# Patient Record
Sex: MALE | Race: WHITE | NOT HISPANIC OR LATINO
[De-identification: names, ages, dates, MRNs, and addresses within clinical notes are randomized per-mention and may not be internally consistent; named-entity substitution may affect disease eponyms.]

---

## 2020-12-31 ENCOUNTER — TRANSCRIPTION ENCOUNTER (OUTPATIENT)
Age: 65
End: 2020-12-31

## 2021-07-27 ENCOUNTER — APPOINTMENT (OUTPATIENT)
Dept: CARDIOLOGY | Facility: CLINIC | Age: 66
End: 2021-07-27
Payer: MEDICARE

## 2021-07-27 ENCOUNTER — NON-APPOINTMENT (OUTPATIENT)
Age: 66
End: 2021-07-27

## 2021-07-27 VITALS
DIASTOLIC BLOOD PRESSURE: 93 MMHG | HEART RATE: 84 BPM | BODY MASS INDEX: 31.08 KG/M2 | WEIGHT: 222 LBS | SYSTOLIC BLOOD PRESSURE: 157 MMHG | HEIGHT: 71 IN | OXYGEN SATURATION: 95 %

## 2021-07-27 DIAGNOSIS — R06.00 DYSPNEA, UNSPECIFIED: ICD-10-CM

## 2021-07-27 DIAGNOSIS — Z86.39 PERSONAL HISTORY OF OTHER ENDOCRINE, NUTRITIONAL AND METABOLIC DISEASE: ICD-10-CM

## 2021-07-27 DIAGNOSIS — R94.31 ABNORMAL ELECTROCARDIOGRAM [ECG] [EKG]: ICD-10-CM

## 2021-07-27 DIAGNOSIS — I10 ESSENTIAL (PRIMARY) HYPERTENSION: ICD-10-CM

## 2021-07-27 DIAGNOSIS — F17.200 NICOTINE DEPENDENCE, UNSPECIFIED, UNCOMPLICATED: ICD-10-CM

## 2021-07-27 DIAGNOSIS — Z86.79 PERSONAL HISTORY OF OTHER DISEASES OF THE CIRCULATORY SYSTEM: ICD-10-CM

## 2021-07-27 DIAGNOSIS — Z87.09 PERSONAL HISTORY OF OTHER DISEASES OF THE RESPIRATORY SYSTEM: ICD-10-CM

## 2021-07-27 PROCEDURE — 93000 ELECTROCARDIOGRAM COMPLETE: CPT

## 2021-07-27 PROCEDURE — 99204 OFFICE O/P NEW MOD 45 MIN: CPT

## 2021-07-27 NOTE — DISCUSSION/SUMMARY
[FreeTextEntry1] : Adryan is a 66-year-old male with hypertension, hyperlipidemia, and asbestos exposure.  He reports mild dyspnea, though his exercise tolerance has been limited by orthopedic issues.  His blood pressure is elevated, though improved on repeat evaluation.  His EKG demonstrates a sinus rhythm, with a nonspecific ST abnormality.  His physical exam is unremarkable.\par \par He will have a 2D echocardiogram to evaluate for structural heart disease, and a pharmacologic nuclear stress test to evaluate for ischemia.  He will not be able to exercise on a treadmill because of knee issues.  I have requested a copy of his most recent blood work and lung CT, along with updated medication list.  I have also stressed the importance of smoking cessation, though he is not ready to quit.  We will speak after the above testing, and arrange follow-up.

## 2021-07-27 NOTE — HISTORY OF PRESENT ILLNESS
[FreeTextEntry1] : Adryan is a 66-year-old male here to establish care.  He reports a history of hypertension and hyperlipidemia.\par \par He last saw a cardiologist about 8 years ago, and reports eventually being sent to [place] for cardiac catheterization which was unremarkable.\par He also reports a past medical history of asbestos exposure.  He has mild dyspnea, which he attributes this.  He has no chest pain or palpitations.  He also denies lower extremity swelling, orthopnea, PND, dizziness, lightheadedness, and near syncope.  His exercise tolerance has been limited by orthopedic issues.\par \par \par He currently smokes about 1/2 pack/day.  He denies a family history of premature CAD.\par He does take some medications, though the list is unavailable.

## 2021-08-02 ENCOUNTER — MED ADMIN CHARGE (OUTPATIENT)
Age: 66
End: 2021-08-02

## 2021-08-02 ENCOUNTER — APPOINTMENT (OUTPATIENT)
Dept: CARDIOLOGY | Facility: CLINIC | Age: 66
End: 2021-08-02
Payer: MEDICARE

## 2021-08-02 PROCEDURE — 93306 TTE W/DOPPLER COMPLETE: CPT

## 2021-08-02 RX ORDER — PERFLUTREN 6.52 MG/ML
6.52 INJECTION, SUSPENSION INTRAVENOUS
Qty: 1 | Refills: 0 | Status: COMPLETED | OUTPATIENT
Start: 2021-08-02

## 2021-08-02 RX ADMIN — PERFLUTREN MG/ML: 6.52 INJECTION, SUSPENSION INTRAVENOUS at 00:00

## 2021-08-23 ENCOUNTER — APPOINTMENT (OUTPATIENT)
Dept: CARDIOLOGY | Facility: CLINIC | Age: 66
End: 2021-08-23
Payer: MEDICARE

## 2021-08-23 PROCEDURE — A9500: CPT

## 2021-08-23 PROCEDURE — 93015 CV STRESS TEST SUPVJ I&R: CPT

## 2021-08-23 PROCEDURE — 78452 HT MUSCLE IMAGE SPECT MULT: CPT

## 2021-09-17 ENCOUNTER — TRANSCRIPTION ENCOUNTER (OUTPATIENT)
Age: 66
End: 2021-09-17

## 2023-08-20 ENCOUNTER — INPATIENT (INPATIENT)
Facility: HOSPITAL | Age: 68
LOS: 2 days | Discharge: HOME | DRG: 347 | End: 2023-08-23
Attending: INTERNAL MEDICINE | Admitting: INTERNAL MEDICINE
Payer: MEDICARE

## 2023-08-20 VITALS
TEMPERATURE: 99 F | DIASTOLIC BLOOD PRESSURE: 51 MMHG | RESPIRATION RATE: 18 BRPM | SYSTOLIC BLOOD PRESSURE: 88 MMHG | WEIGHT: 235.01 LBS | OXYGEN SATURATION: 97 % | HEIGHT: 71 IN | HEART RATE: 76 BPM

## 2023-08-20 DIAGNOSIS — K61.0 ANAL ABSCESS: ICD-10-CM

## 2023-08-20 DIAGNOSIS — R79.89 OTHER SPECIFIED ABNORMAL FINDINGS OF BLOOD CHEMISTRY: ICD-10-CM

## 2023-08-20 DIAGNOSIS — D64.9 ANEMIA, UNSPECIFIED: ICD-10-CM

## 2023-08-20 DIAGNOSIS — R93.89 ABNORMAL FINDINGS ON DIAGNOSTIC IMAGING OF OTHER SPECIFIED BODY STRUCTURES: ICD-10-CM

## 2023-08-20 DIAGNOSIS — E78.5 HYPERLIPIDEMIA, UNSPECIFIED: ICD-10-CM

## 2023-08-20 DIAGNOSIS — I10 ESSENTIAL (PRIMARY) HYPERTENSION: ICD-10-CM
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
## 2023-08-20 DIAGNOSIS — Z29.9 ENCOUNTER FOR PROPHYLACTIC MEASURES, UNSPECIFIED: ICD-10-CM

## 2023-08-20 LAB
ALBUMIN SERPL ELPH-MCNC: 3 G/DL — LOW (ref 3.3–5)
ALP SERPL-CCNC: 55 U/L — SIGNIFICANT CHANGE UP (ref 40–120)
ALT FLD-CCNC: 69 U/L — SIGNIFICANT CHANGE UP (ref 12–78)
ANION GAP SERPL CALC-SCNC: 8 MMOL/L — SIGNIFICANT CHANGE UP (ref 5–17)
APPEARANCE UR: ABNORMAL
APTT BLD: 32.3 SEC — SIGNIFICANT CHANGE UP (ref 24.5–35.6)
AST SERPL-CCNC: 227 U/L — HIGH (ref 15–37)
BASOPHILS # BLD AUTO: 0.02 K/UL — SIGNIFICANT CHANGE UP (ref 0–0.2)
BASOPHILS NFR BLD AUTO: 0.2 % — SIGNIFICANT CHANGE UP (ref 0–2)
BILIRUB SERPL-MCNC: 0.9 MG/DL — SIGNIFICANT CHANGE UP (ref 0.2–1.2)
BILIRUB UR-MCNC: NEGATIVE — SIGNIFICANT CHANGE UP
BUN SERPL-MCNC: 25 MG/DL — HIGH (ref 7–23)
CALCIUM SERPL-MCNC: 8.7 MG/DL — SIGNIFICANT CHANGE UP (ref 8.5–10.1)
CHLORIDE SERPL-SCNC: 106 MMOL/L — SIGNIFICANT CHANGE UP (ref 96–108)
CO2 SERPL-SCNC: 25 MMOL/L — SIGNIFICANT CHANGE UP (ref 22–31)
COLOR SPEC: SIGNIFICANT CHANGE UP
CREAT SERPL-MCNC: 1.6 MG/DL — HIGH (ref 0.5–1.3)
DIFF PNL FLD: ABNORMAL
EGFR: 46 ML/MIN/1.73M2 — LOW
EOSINOPHIL # BLD AUTO: 0 K/UL — SIGNIFICANT CHANGE UP (ref 0–0.5)
EOSINOPHIL NFR BLD AUTO: 0 % — SIGNIFICANT CHANGE UP (ref 0–6)
GLUCOSE SERPL-MCNC: 123 MG/DL — HIGH (ref 70–99)
GLUCOSE UR QL: NEGATIVE MG/DL — SIGNIFICANT CHANGE UP
HCT VFR BLD CALC: 37.8 % — LOW (ref 39–50)
HGB BLD-MCNC: 12.8 G/DL — LOW (ref 13–17)
IMM GRANULOCYTES NFR BLD AUTO: 0.5 % — SIGNIFICANT CHANGE UP (ref 0–0.9)
INR BLD: 1.3 RATIO — HIGH (ref 0.85–1.18)
KETONES UR-MCNC: 15 MG/DL
LACTATE SERPL-SCNC: 1.4 MMOL/L — SIGNIFICANT CHANGE UP (ref 0.7–2)
LEUKOCYTE ESTERASE UR-ACNC: ABNORMAL
LYMPHOCYTES # BLD AUTO: 0.83 K/UL — LOW (ref 1–3.3)
LYMPHOCYTES # BLD AUTO: 9.1 % — LOW (ref 13–44)
MCHC RBC-ENTMCNC: 33.6 PG — SIGNIFICANT CHANGE UP (ref 27–34)
MCHC RBC-ENTMCNC: 33.9 GM/DL — SIGNIFICANT CHANGE UP (ref 32–36)
MCV RBC AUTO: 99.2 FL — SIGNIFICANT CHANGE UP (ref 80–100)
MONOCYTES # BLD AUTO: 0.53 K/UL — SIGNIFICANT CHANGE UP (ref 0–0.9)
MONOCYTES NFR BLD AUTO: 5.8 % — SIGNIFICANT CHANGE UP (ref 2–14)
NEUTROPHILS # BLD AUTO: 7.67 K/UL — HIGH (ref 1.8–7.4)
NEUTROPHILS NFR BLD AUTO: 84.4 % — HIGH (ref 43–77)
NITRITE UR-MCNC: NEGATIVE — SIGNIFICANT CHANGE UP
NRBC # BLD: 0 /100 WBCS — SIGNIFICANT CHANGE UP (ref 0–0)
PH UR: 5.5 — SIGNIFICANT CHANGE UP (ref 5–8)
PLATELET # BLD AUTO: 142 K/UL — LOW (ref 150–400)
POTASSIUM SERPL-MCNC: 3.5 MMOL/L — SIGNIFICANT CHANGE UP (ref 3.5–5.3)
POTASSIUM SERPL-SCNC: 3.5 MMOL/L — SIGNIFICANT CHANGE UP (ref 3.5–5.3)
PROT SERPL-MCNC: 7.1 G/DL — SIGNIFICANT CHANGE UP (ref 6–8.3)
PROT UR-MCNC: 100 MG/DL
PROTHROM AB SERPL-ACNC: 15.1 SEC — HIGH (ref 9.5–13)
RBC # BLD: 3.81 M/UL — LOW (ref 4.2–5.8)
RBC # FLD: 12.8 % — SIGNIFICANT CHANGE UP (ref 10.3–14.5)
SODIUM SERPL-SCNC: 139 MMOL/L — SIGNIFICANT CHANGE UP (ref 135–145)
SP GR SPEC: 1.04 — HIGH (ref 1–1.03)
TRANSFERRIN SERPL-MCNC: 179 MG/DL — LOW (ref 200–360)
UROBILINOGEN FLD QL: 1 MG/DL — SIGNIFICANT CHANGE UP (ref 0.2–1)
WBC # BLD: 9.1 K/UL — SIGNIFICANT CHANGE UP (ref 3.8–10.5)
WBC # FLD AUTO: 9.1 K/UL — SIGNIFICANT CHANGE UP (ref 3.8–10.5)

## 2023-08-20 PROCEDURE — 99285 EMERGENCY DEPT VISIT HI MDM: CPT

## 2023-08-20 PROCEDURE — 10060 I&D ABSCESS SIMPLE/SINGLE: CPT

## 2023-08-20 PROCEDURE — 93010 ELECTROCARDIOGRAM REPORT: CPT

## 2023-08-20 PROCEDURE — 74177 CT ABD & PELVIS W/CONTRAST: CPT | Mod: 26,MA

## 2023-08-20 PROCEDURE — 99223 1ST HOSP IP/OBS HIGH 75: CPT | Mod: 25

## 2023-08-20 PROCEDURE — 99223 1ST HOSP IP/OBS HIGH 75: CPT | Mod: GC

## 2023-08-20 RX ORDER — SODIUM CHLORIDE 9 MG/ML
1000 INJECTION INTRAMUSCULAR; INTRAVENOUS; SUBCUTANEOUS
Refills: 0 | Status: DISCONTINUED | OUTPATIENT
Start: 2023-08-20 | End: 2023-08-21

## 2023-08-20 RX ORDER — PIPERACILLIN AND TAZOBACTAM 4; .5 G/20ML; G/20ML
3.38 INJECTION, POWDER, LYOPHILIZED, FOR SOLUTION INTRAVENOUS ONCE
Refills: 0 | Status: COMPLETED | OUTPATIENT
Start: 2023-08-20 | End: 2023-08-20

## 2023-08-20 RX ORDER — PIPERACILLIN AND TAZOBACTAM 4; .5 G/20ML; G/20ML
3.38 INJECTION, POWDER, LYOPHILIZED, FOR SOLUTION INTRAVENOUS ONCE
Refills: 0 | Status: COMPLETED | OUTPATIENT
Start: 2023-08-21 | End: 2023-08-21

## 2023-08-20 RX ORDER — PIPERACILLIN AND TAZOBACTAM 4; .5 G/20ML; G/20ML
3.38 INJECTION, POWDER, LYOPHILIZED, FOR SOLUTION INTRAVENOUS ONCE
Refills: 0 | Status: DISCONTINUED | OUTPATIENT
Start: 2023-08-21 | End: 2023-08-21

## 2023-08-20 RX ORDER — SODIUM CHLORIDE 9 MG/ML
1000 INJECTION INTRAMUSCULAR; INTRAVENOUS; SUBCUTANEOUS ONCE
Refills: 0 | Status: COMPLETED | OUTPATIENT
Start: 2023-08-20 | End: 2023-08-20

## 2023-08-20 RX ORDER — SIMVASTATIN 20 MG/1
1 TABLET, FILM COATED ORAL
Refills: 0 | DISCHARGE

## 2023-08-20 RX ORDER — THIAMINE MONONITRATE (VIT B1) 100 MG
100 TABLET ORAL DAILY
Refills: 0 | Status: DISCONTINUED | OUTPATIENT
Start: 2023-08-20 | End: 2023-08-23

## 2023-08-20 RX ORDER — NICOTINE POLACRILEX 2 MG
1 GUM BUCCAL DAILY
Refills: 0 | Status: DISCONTINUED | OUTPATIENT
Start: 2023-08-20 | End: 2023-08-23

## 2023-08-20 RX ORDER — PIPERACILLIN AND TAZOBACTAM 4; .5 G/20ML; G/20ML
3.38 INJECTION, POWDER, LYOPHILIZED, FOR SOLUTION INTRAVENOUS EVERY 8 HOURS
Refills: 0 | Status: DISCONTINUED | OUTPATIENT
Start: 2023-08-21 | End: 2023-08-23

## 2023-08-20 RX ORDER — ATORVASTATIN CALCIUM 80 MG/1
40 TABLET, FILM COATED ORAL AT BEDTIME
Refills: 0 | Status: DISCONTINUED | OUTPATIENT
Start: 2023-08-20 | End: 2023-08-23

## 2023-08-20 RX ORDER — FOLIC ACID 0.8 MG
1 TABLET ORAL DAILY
Refills: 0 | Status: DISCONTINUED | OUTPATIENT
Start: 2023-08-20 | End: 2023-08-23

## 2023-08-20 RX ORDER — ACETAMINOPHEN 500 MG
650 TABLET ORAL EVERY 6 HOURS
Refills: 0 | Status: DISCONTINUED | OUTPATIENT
Start: 2023-08-20 | End: 2023-08-23

## 2023-08-20 RX ORDER — LANOLIN ALCOHOL/MO/W.PET/CERES
3 CREAM (GRAM) TOPICAL AT BEDTIME
Refills: 0 | Status: DISCONTINUED | OUTPATIENT
Start: 2023-08-20 | End: 2023-08-23

## 2023-08-20 RX ADMIN — SODIUM CHLORIDE 75 MILLILITER(S): 9 INJECTION INTRAMUSCULAR; INTRAVENOUS; SUBCUTANEOUS at 20:16

## 2023-08-20 RX ADMIN — PIPERACILLIN AND TAZOBACTAM 200 GRAM(S): 4; .5 INJECTION, POWDER, LYOPHILIZED, FOR SOLUTION INTRAVENOUS at 15:48

## 2023-08-20 RX ADMIN — PIPERACILLIN AND TAZOBACTAM 200 GRAM(S): 4; .5 INJECTION, POWDER, LYOPHILIZED, FOR SOLUTION INTRAVENOUS at 06:59

## 2023-08-20 RX ADMIN — PIPERACILLIN AND TAZOBACTAM 3.38 GRAM(S): 4; .5 INJECTION, POWDER, LYOPHILIZED, FOR SOLUTION INTRAVENOUS at 07:30

## 2023-08-20 RX ADMIN — ATORVASTATIN CALCIUM 40 MILLIGRAM(S): 80 TABLET, FILM COATED ORAL at 21:34

## 2023-08-20 RX ADMIN — SODIUM CHLORIDE 1000 MILLILITER(S): 9 INJECTION INTRAMUSCULAR; INTRAVENOUS; SUBCUTANEOUS at 06:59

## 2023-08-20 RX ADMIN — SODIUM CHLORIDE 1000 MILLILITER(S): 9 INJECTION INTRAMUSCULAR; INTRAVENOUS; SUBCUTANEOUS at 07:30

## 2023-08-20 RX ADMIN — PIPERACILLIN AND TAZOBACTAM 25 GRAM(S): 4; .5 INJECTION, POWDER, LYOPHILIZED, FOR SOLUTION INTRAVENOUS at 21:34

## 2023-08-20 NOTE — PATIENT PROFILE ADULT - FALL HARM RISK - HARM RISK INTERVENTIONS
Communicate Risk of Fall with Harm to all staff/Reinforce activity limits and safety measures with patient and family/Tailored Fall Risk Interventions/Visual Cue: Yellow wristband and red socks/Bed in lowest position, wheels locked, appropriate side rails in place/Call bell, personal items and telephone in reach/Instruct patient to call for assistance before getting out of bed or chair/Non-slip footwear when patient is out of bed/[place] to call system/Physically safe environment - no spills, clutter or unnecessary equipment/Purposeful Proactive Rounding/Room/bathroom lighting operational, light cord in reach Assistance with ambulation/Assistance OOB with selected safe patient handling equipment/Communicate Risk of Fall with Harm to all staff/Monitor for mental status changes/Monitor gait and stability/Reinforce activity limits and safety measures with patient and family/Tailored Fall Risk Interventions/Toileting schedule using arm’s reach rule for commode and bathroom/Use of alarms - bed, chair and/or voice tab/Visual Cue: Yellow wristband and red socks/Bed in lowest position, wheels locked, appropriate side rails in place/Call bell, personal items and telephone in reach/Instruct patient to call for assistance before getting out of bed or chair/Non-slip footwear when patient is out of bed/[place] to call system/Physically safe environment - no spills, clutter or unnecessary equipment/Purposeful Proactive Rounding/Room/bathroom lighting operational, light cord in reach

## 2023-08-20 NOTE — ED PROVIDER NOTE - PHYSICAL EXAMINATION
Gen: Alert, NAD  Head/eyes: NC/AT, PERRL  ENT: airway patent  Neck: supple  Pulm/lung: Bilateral clear BS  CV/heart: RRR  GI/Abd: soft, NT/ND, +BS, no guarding/rebound tenderness, rectal exam: +perirectal abscess, necrotic appearing, no drainage, +surrounding induration  Musculoskeletal: no edema/erythema/cyanosis  Skin: no rash  Neuro: AAOx3, grossly intact

## 2023-08-20 NOTE — H&P ADULT - PROBLEM SELECTOR PLAN 4
Chronic  - Continue home medications: Chronic  - Continue home simvastatin 40mg therapeutically interchanged to atorvastatin 40mg Chronic, hypotensive 88/51 on admission   - Hold home hypertensive medications   - Pt's home htn meds unknown, [place] pharmacy closed, f/u tomorrow AM   - Monitor routine hemodynamics Patient was noted to have abnormal LFTs with AST > 2x ALT likely 2/2 to alcohol use disorder   - f/u Hepatitis panel  - [facility] protocol  - c/w folate, multivitamin, thiamine

## 2023-08-20 NOTE — ED ADULT NURSE NOTE - CHIEF COMPLAINT
Called the home phone number and spoke with his wife Alexsander Dan orders in the computer  He is having labs drawn at the [place] and clearly they would order the labs at the [place]  She will relay the phone message to [facility] The patient is a 68y Male complaining of hypotension.

## 2023-08-20 NOTE — H&P ADULT - PROBLEM SELECTOR PLAN 1
CT a/p: Perianal/ischioanal soft tissue are only partially imaged, no abscess seen.  - I&D performed in ED by Dr. Cabello   - Zosyn 3.375g IV x1 in ED   - Continue Zosyn 3.375G IV q8h   - ID Dr. Ying consulted CT a/p: Perianal/ischioanal soft tissue are only partially imaged, no abscess seen.  - I&D performed in ED by Dr. Cabello   - Zosyn 3.375g IV x1 in ED   - Continue Zosyn 3.375G IV q8h   - f/u BC and abscess culture  - trend WBCs  - ID Dr. Ying consulted  - Surgery (Dr. Cabello) following, f/u recs CT a/p: Perianal/ischioanal soft tissue are only partially imaged, no abscess seen.  - I&D performed in ED by Dr. Cabello   - Zosyn 3.375g IV x1 in ED   - Continue Zosyn 3.375G IV q8h   - f/u BC and abscess culture  - trend WBCs  - ID (Dr. Ying) consulted, f/u recs  - Surgery (Dr. Cabello) following, f/u recs

## 2023-08-20 NOTE — ED ADULT NURSE REASSESSMENT NOTE - NS ED NURSE REASSESS COMMENT FT1
Assumed patient care, patient AOx3, denies complaints @ this time, patient remains on cardiac monitor, wife at bedside. Will continue to monitor.

## 2023-08-20 NOTE — H&P ADULT - NSHPSOCIALHISTORY_GEN_ALL_CORE
Tobacco: denies  EtOH: denies  Recreational drug use: denies  Lives with: patient lives alone at home  Ambulates: independent  ADLs: independent  Vaccinations: Tobacco: Current smoker 22.5 pack history  EtOH: Drinks 1/2 pint of vodka everyday. Last drink 2 days  Recreational drug use: denies  Lives with: patient lives with wife at home  Ambulates: independent  ADLs: independent  Colonoscopy 1 year ago with polyps, next one this year

## 2023-08-20 NOTE — H&P ADULT - PROBLEM SELECTOR PLAN 3
Chronic, stable on admission  - Continue home medications -- with hold parameters  - Monitor routine hemodynamics Chronic, hypotensive 88/51 on admission   - Hold home hypertensive medications   - Monitor routine hemodynamics Chronic, hypotensive 88/51 on admission   - Hold home hypertensive medications   - Pt's home htn meds unknown, pharmacy closed, f/u tomorrow AM   - Monitor routine hemodynamics CT a/p: Incidental multiseptate left renal cystic lesion, 5 cm, indeterminate, possibly cystic neoplasm or complicated cyst.  - Hemo onc (Dr. Dee) consulted, f/u recs CT a/p: Incidental multiseptate left renal cystic lesion, 5 cm, indeterminate, possibly cystic neoplasm or complicated cyst.  - Hemo onc (Dr. Dee) consulted, f/u recs  - Renal (Dr. Campbell) consult, f/u recs

## 2023-08-20 NOTE — H&P ADULT - NSHPPHYSICALEXAM_GEN_ALL_CORE
T(C): 36.7 (08-20-23 @ 11:48), Max: 37.1 (08-20-23 @ 05:31)  HR: 70 (08-20-23 @ 11:48) (70 - 76)  BP: 95/58 (08-20-23 @ 11:48) (88/51 - 102/56)  RR: 16 (08-20-23 @ 11:48) (16 - 18)  SpO2: 95% (08-20-23 @ 11:48) (95% - 97%)    GENERAL: patient appears well, no acute distress, appropriate, pleasant  EYES: sclera clear, no exudates  ENMT: oropharynx clear without erythema, no exudates, moist mucous membranes  NECK: supple, soft, no thyromegaly noted  LUNGS: good air entry bilaterally, clear to auscultation, symmetric breath sounds, no wheezing or rhonchi appreciated  HEART: soft S1/S2, regular rate and rhythm, no murmurs noted, no lower extremity edema  GASTROINTESTINAL: abdomen is soft, nontender, nondistended, normoactive bowel sounds, no palpable masses  INTEGUMENT: good skin turgor, no lesions noted  MUSCULOSKELETAL: no clubbing or cyanosis, no obvious deformity  NEUROLOGIC: awake, alert, oriented x3, good muscle tone in 4 extremities, no obvious sensory deficits  PSYCHIATRIC: mood is good, affect is congruent, linear and logical thought process  HEME/LYMPH: no palpable supraclavicular nodules, no obvious ecchymosis or petechiae T(C): 36.7 (08-20-23 @ 11:48), Max: 37.1 (08-20-23 @ 05:31)  HR: 70 (08-20-23 @ 11:48) (70 - 76)  BP: 95/58 (08-20-23 @ 11:48) (88/51 - 102/56)  RR: 16 (08-20-23 @ 11:48) (16 - 18)  SpO2: 95% (08-20-23 @ 11:48) (95% - 97%)    GENERAL: patient appears well, no acute distress, appropriate, pleasant  EYES: sclera clear, no exudates  ENMT: oropharynx clear without erythema, no exudates, moist mucous membranes  NECK: supple, soft, no thyromegaly noted  LUNGS: good air entry bilaterally, clear to auscultation, symmetric breath sounds, no wheezing or rhonchi appreciated  HEART: soft S1/S2, regular rate and rhythm, no murmurs noted, no lower extremity edema  GASTROINTESTINAL: abdomen is soft, nontender, nondistended, normoactive bowel sounds, Perirectal abscess dressing clean dry and intact  INTEGUMENT: good skin turgor, no lesions noted  MUSCULOSKELETAL: no clubbing or cyanosis, no obvious deformity  NEUROLOGIC: awake, alert, oriented x3, good muscle tone in 4 extremities, no obvious sensory deficits  PSYCHIATRIC: mood is good, affect is congruent, linear and logical thought process  HEME/LYMPH: no palpable supraclavicular nodules, no obvious ecchymosis or petechiae

## 2023-08-20 NOTE — ED PROVIDER NOTE - CLINICAL SUMMARY MEDICAL DECISION MAKING FREE TEXT BOX
68-year-old male history of hypertension hyperlipidemia brought in by EMS for a fall found to be hypotensive has a history of his perirectal abscesses.  Denies any fever chills found to have a rectal temperature of 99.5.  Patient denies any upper respiratory symptoms.  Denies any nausea vomiting diarrhea.  Denies any abdominal pain.    perirectal abscess, sepsis, labs, CT a/p, IV abx, IV fluids

## 2023-08-20 NOTE — ED ADULT NURSE NOTE - OBJECTIVE STATEMENT
Pt to ED via EMS from home, c/c weakness, hypotension. Pt states he had a perirectal "boil" burst, noted with blood and pus filled abscess surrounded by induration. Pt fell at home in [place] and was unable to get up, pt wife was trying to get him up and ultimately had to call 911 for assistance. Pt o/w in NAD, 18 ga cath in left AC from EMS.

## 2023-08-20 NOTE — ED PROVIDER NOTE - PROGRESS NOTE DETAILS
I&D performed in emergency department by Dr. Мария mac and patient will be admitted to the medical service.

## 2023-08-20 NOTE — CONSULT NOTE ADULT - SUBJECTIVE AND OBJECTIVE BOX
NEPHROLOGY CONSULTATION    CHIEF COMPLAINT: fall    HPI:  Pt is 67 yo M w/pmh of HTN, HLD who presents s/p fall. He had felt weak and could not get back up, denied feeling dizzy, loss of consciousness, or head trauma. His wife had tried to help him up but could not and called the police to help him back up. At 4 am, the patient's wife had walked to the living room where she saw him down on the floor sleeping. The patient states he did not feel dizzy, loss of consciousness or any head trauma prior to the second fall. He did not get back up because he was very tired. No recent weight loss, night sweats, or fatigue. Patient dx w/perianal abscess for ~ 1 week. No fever, chills, chest pain, palpitations, SOB, cough, abdominal pain, nausea, vomiting, diarrhea, constipation, hematochezia, melena, urinary frequency, urgency, dysuria, hematuria, headaches, changes in vision, dizziness, numbness, tingling. No recent travel, recent antibiotic use, or sick contacts. CT A/P w/renal mass.     ROS:  as above    Allergies:  No Known Allergies    PAST MEDICAL & SURGICAL HISTORY:  HLD (hyperlipidemia)  HTN (hypertension)    SOCIAL HISTORY:  negative    FAMILY HISTORY:  FH: chronic kidney disease (Father)  FH: HTN (hypertension) (Father)    MEDICATIONS  (STANDING):  atorvastatin 40 milliGRAM(s) Oral at bedtime  folic acid 1 milliGRAM(s) Oral daily  multivitamin 1 Tablet(s) Oral daily  nicotine -  14 mG/24Hr(s) Patch 1 Patch Transdermal daily  piperacillin/tazobactam IVPB.- 3.375 Gram(s) IV Intermittent once  thiamine 100 milliGRAM(s) Oral daily    Home Medications:  simvastatin 40 mg oral tablet: 1 tab(s) orally once a day (20 Aug 2023 13:01)    Vital Signs Last 24 Hrs  T(C): 36.7 (23 @ 15:56), Max: 37.1 (23 @ 05:31)  T(F): 98.1 (23 @ 15:56), Max: 98.7 (23 @ 05:31)  HR: 74 (23 @ 15:56) (70 - 76)  BP: 103/55 (23 @ 15:56) (88/51 - 103/55)  RR: 17 (23 @ 15:56) (16 - 18)  SpO2: 96% (23 @ 15:56) (95% - 97%)    LABS:                        12.8   9.10  )-----------( 142      ( 20 Aug 2023 06:10 )             37.8         139  |  106  |  25<H>  ----------------------------<  123<H>  3.5   |  25  |  1.60<H>    Ca    8.7      20 Aug 2023 06:10    TPro  7.1  /  Alb  3.0<L>  /  TBili  0.9  /  DBili  x   /  AST  227<H>  /  ALT  69  /  AlkPhos  55      Urinalysis Basic - ( 20 Aug 2023 09:09 )    Color: Dark Yellow / Appearance: Cloudy / S.037 / pH: x  Gluc: x / Ketone: 15 mg/dL  / Bili: Negative / Urobili: 1.0 mg/dL   Blood: x / Protein: 100 mg/dL / Nitrite: Negative   Leuk Esterase: Trace / RBC: 2 /HPF / WBC 15 /HPF   Sq Epi: x / Non Sq Epi: x / Bacteria: x    LIVER FUNCTIONS - ( 20 Aug 2023 06:10 )  Alb: 3.0 g/dL / Pro: 7.1 g/dL / ALK PHOS: 55 U/L / ALT: 69 U/L / AST: 227 U/L / GGT: x           PT/INR - ( 20 Aug 2023 06:10 )   PT: 15.1 sec;   INR: 1.30 ratio       PTT - ( 20 Aug 2023 06:10 )  PTT:32.3 sec    A/P:    full consult to follow    [phone]   NEPHROLOGY CONSULTATION    CHIEF COMPLAINT: fall    HPI:  Pt is 67 yo M w/pmh of HTN, HLD who presents s/p fall. He initially fell in the bathroom, felt weak and could not get up, denies feeling dizzy, loss of consciousness, or head trauma. His wife had tried to help him up but could not, and called the police to help him. Pt fell asleep in recliner chair. At ~ 4 am, the patient's wife had walked to the living room where she saw him down on the floor sleeping. The patient does not recall how he got to be on the floor, he did not feel dizzy, believes he did not loose consciousness or had head trauma. He could not get back up. EMS was called. No recent weight loss, night sweats, or fatigue. Patient dx w/perianal abscess ~ 1 week ago. No fever, chills, chest pain, palpitations, SOB, cough, abdominal pain, nausea, vomiting, diarrhea, constipation, hematochezia, melena, urinary frequency, urgency, dysuria, gross hematuria, headaches, changes in vision, dizziness, numbness, tingling. No recent travel, recent antibiotic use, or sick contacts. CT A/P w/L renal mass. Elevated Cr noted as well.     ROS:  as above    Allergies:  No Known Allergies    PAST MEDICAL & SURGICAL HISTORY:  HLD (hyperlipidemia)  HTN (hypertension)    SOCIAL HISTORY:  negative    FAMILY HISTORY:  FH: chronic kidney disease (Father)  FH: HTN (hypertension) (Father)    MEDICATIONS  (STANDING):  atorvastatin 40 milliGRAM(s) Oral at bedtime  folic acid 1 milliGRAM(s) Oral daily  multivitamin 1 Tablet(s) Oral daily  nicotine -  14 mG/24Hr(s) Patch 1 Patch Transdermal daily  piperacillin/tazobactam IVPB.- 3.375 Gram(s) IV Intermittent once  thiamine 100 milliGRAM(s) Oral daily    Home Medications:  simvastatin 40 mg oral tablet: 1 tab(s) orally once a day (20 Aug 2023 13:01)    Vital Signs Last 24 Hrs  T(C): 36.7 (23 @ 15:56), Max: 37.1 (23 @ 05:31)  T(F): 98.1 (23 @ 15:56), Max: 98.7 (23 @ 05:31)  HR: 74 (23 @ 15:56) (70 - 76)  BP: 103/55 (23 @ 15:56) (88/51 - 103/55)  RR: 17 (23 @ 15:56) (16 - 18)  SpO2: 96% (23 @ 15:56) (95% - 97%)    s1s2  b/l air entry  soft, ND  no edema    LABS:                        12.8   9.10  )-----------( 142      ( 20 Aug 2023 06:10 )             37.8         139  |  106  |  25<H>  ----------------------------<  123<H>  3.5   |  25  |  1.60<H>    Ca    8.7      20 Aug 2023 06:10    TPro  7.1  /  Alb  3.0<L>  /  TBili  0.9  /  DBili  x   /  AST  227<H>  /  ALT  69  /  AlkPhos  55  0820    Urinalysis Basic - ( 20 Aug 2023 09:09 )    Color: Dark Yellow / Appearance: Cloudy / S.037 / pH: x  Gluc: x / Ketone: 15 mg/dL  / Bili: Negative / Urobili: 1.0 mg/dL   Blood: x / Protein: 100 mg/dL / Nitrite: Negative   Leuk Esterase: Trace / RBC: 2 /HPF / WBC 15 /HPF   Sq Epi: x / Non Sq Epi: x / Bacteria: x    LIVER FUNCTIONS - ( 20 Aug 2023 06:10 )  Alb: 3.0 g/dL / Pro: 7.1 g/dL / ALK PHOS: 55 U/L / ALT: 69 U/L / AST: 227 U/L / GGT: x           PT/INR - ( 20 Aug 2023 06:10 )   PT: 15.1 sec;   INR: 1.30 ratio       PTT - ( 20 Aug 2023 06:10 )  PTT:32.3 sec    A/P:    S/p falls x 2, unable to get up, hypotensive on adm  Elevated Cr on adm (Cr 1.08 - 3/29/19), would try to obtain recent baseline Cr from PMD, Dr. Crafa tomorrow  S/p CT w/IV dye today, at risk of contrast MYRA  Concern for possible rhabdo  F/u BMP, CPK in am  Will order IVF  Avoid nephrotoxins   eval for L renal lesion  No objection to MRI w/contrast if needed    [phone]

## 2023-08-20 NOTE — PATIENT PROFILE ADULT - NSPRONUTRITIONRISK_GEN_A_NUR
Reviewed UGI with Dr Robins.  Study is fairly normal.  Now pt has gastroesophageal reflux.  Pt may need to stay of PPI.  She can come to office to discuss.  Called pt to review.  She did ask about the Hiatal Hernia.  Informed her that they are calling it small. As long as it is small they just leave it alone.  Pt stated understanding.  
No indicators present

## 2023-08-20 NOTE — H&P ADULT - NSHPREVIEWOFSYSTEMS_GEN_ALL_CORE
CONSTITUTIONAL: denies fever, chills, diaphoresis, fatigue, weakness, dizziness, lightheadedness  HEENT: denies blurred vision, sore throat, cough  SKIN: denies new lesions, rash, hives, itching  CARDIOVASCULAR: denies chest pain, chest pressure, palpitations  RESPIRATORY: denies shortness of breath, DÍAZ, wheezing, sputum production  GASTROINTESTINAL: denies nausea, vomiting, diarrhea, constipation, abdominal pain, hematochezia, melena  GENITOURINARY: denies dysuria, polyuria, hematuria, discharge  NEUROLOGICAL: denies syncope, LOC, numbness, headache, focal weakness  MUSCULOSKELETAL: denies new joint pain, joint swelling, muscle aches  HEMATOLOGIC: denies gross bleeding, bruising  LYMPHATICS: denies enlarged lymph nodes, extremity swelling  PSYCHIATRIC: denies recent changes in anxiety, depression  ENDOCRINOLOGIC: denies sweating, cold or heat intolerance

## 2023-08-20 NOTE — ED ADULT NURSE NOTE - NSFALLHARMRISKINTERV_ED_ALL_ED
Assistance OOB with selected safe patient handling equipment if applicable/Assistance with ambulation/Communicate risk of Fall with Harm to all staff, patient, and family/Monitor gait and stability/Provide visual cue: red socks, yellow wristband, yellow gown, etc/Reinforce activity limits and safety measures with patient and family/Toileting schedule using arm’s reach rule for commode and bathroom/Bed in lowest position, wheels locked, appropriate side rails in place/Call bell, personal items and telephone in reach/Instruct patient to call for assistance before getting out of bed/chair/stretcher/Non-slip footwear applied when patient is off stretcher/[place] to call system/Physically safe environment - no spills, clutter or unnecessary equipment/Purposeful Proactive Rounding/Room/bathroom lighting operational, light cord in reach

## 2023-08-20 NOTE — CONSULT NOTE ADULT - SUBJECTIVE AND OBJECTIVE BOX
HPI:  Mr. Patton is a 69 yo M, pmh HTN, HLD, BIBA after a fall found to be hypotensive. He reports that he woke up in the middle of the night to use the bathroom and he isn't sure what happened but he fell and could not get up, he denies feeling dizzy, loss of consciousness, denies any head trauma. His wife called the police who arrive to help him up and helped him into a recliner to go back to sleep. He reports that he later rolled over in the recliner and fell out of the recliner onto his hardwood floor, again denies any injury but admits that he thought he was dreaming until his wife woke him up asking why he was on the floor. She then called EMS to bring him to the hospital. In the ED, he denies any upper respiratory symptoms, denies any fever or chills though rectal temp was noted to be 99.5. Denies any nausea or vomiting, reports that his last bowel movement was yesterday morning, normal without any blood in his stool.     Surgery consulted for history of perirectal abscess that patient has had for a while that his wife believes "popped" last night. Reports that he had a colonoscopy last year but does not remember and results, only that he would have to have his next scope in <5 years.     PAST MEDICAL & SURGICAL HISTORY:  no surgical hx    HLD    HTN    REVIEW OF SYSTEMS  Head: denies headaches, dizziness & lightheadedness  Eyes: denies changes in vision, eye pain, double vision & eye discharge  Ears: denies changes in hearing & ear discharge  Nose: denies rhinorrhea  Mouth: denies bleeding gums & sore tongue & sore throat  Neck: denies swollen lymph nodes   Respiratory: denies SOB, cough, sputum production, wheezing  Cardiac: denies CP & irregular heart beat  Abdominal: denies abdominal pain, + in bowel movements  : +frequent urination  Musculoskeletal: denies joint pain & muscle pain  Neuro: denies involuntary muscle movements  Psych: no depression, no anxiety    Allergies  No Known Allergies      Vital Signs Last 24 Hrs  T(C): 36.7 (20 Aug 2023 07:19), Max: 37.1 (20 Aug 2023 05:31)  T(F): 98 (20 Aug 2023 07:19), Max: 98.7 (20 Aug 2023 05:31)  HR: 75 (20 Aug 2023 07:19) (75 - 76)  BP: 98/53 (20 Aug 2023 07:19) (88/51 - 98/53)  BP(mean): --  RR: 18 (20 Aug 2023 07:19) (18 - 18)  SpO2: 95% (20 Aug 2023 07:19) (95% - 97%)    Parameters below as of 20 Aug 2023 07:19  Patient On (Oxygen Delivery Method): room air        PHYSICAL EXAM:  Constitutional: AAOx3, no acute distress  HEENT: NCAT, airway patent  Cardiovascular: RRR, pulses present bilaterally  Respiratory: nonlabored breathing  Gastrointestinal: abdomen soft, nontender, non distended  Neuro: no focal deficits  Rectal: wound at 12 o'clock position with 2cm necrotic appearing tissue with surrounding (~4cm) induration, erythema and edema, no active bleeding or drainage noted, nontender    LABS:                        12.8   9.10  )-----------( 142      ( 20 Aug 2023 06:10 )             37.8     08-20    139  |  106  |  25<H>  ----------------------------<  123<H>  3.5   |  25  |  1.60<H>    Ca    8.7      20 Aug 2023 06:10    TPro  7.1  /  Alb  3.0<L>  /  TBili  0.9  /  DBili  x   /  AST  227<H>  /  ALT  69  /  AlkPhos  55  08-20    PT/INR - ( 20 Aug 2023 06:10 )   PT: 15.1 sec;   INR: 1.30 ratio         PTT - ( 20 Aug 2023 06:10 )  PTT:32.3 sec  Urinalysis Basic - ( 20 Aug 2023 09:09 )    Color: Dark Yellow / Appearance: Cloudy / S.037 / pH: x  Gluc: x / Ketone: 15 mg/dL  / Bili: Negative / Urobili: 1.0 mg/dL   Blood: x / Protein: 100 mg/dL / Nitrite: Negative   Leuk Esterase: Trace / RBC: 2 /HPF / WBC 15 /HPF   Sq Epi: x / Non Sq Epi: x / Bacteria: x

## 2023-08-20 NOTE — ED PROVIDER NOTE - CARE PROVIDER_API CALL
Charlie Ortiz  Internal Medicine  [address] [place] [address]  Phone: [phone]  Fax: [phone]  Follow Up Time:

## 2023-08-20 NOTE — H&P ADULT - PROBLEM SELECTOR PLAN 6
DVT PPx: SCDs, chemical ppx held in the setting of new renal malignancy Chronic, hypotensive 88/51 on admission   - Hold home hypertensive medications   - Pt's home htn meds unknown, [place] pharmacy closed, f/u tomorrow AM   - Monitor routine hemodynamics

## 2023-08-20 NOTE — PATIENT PROFILE ADULT - NSTOBACCO TYPE_GEN_A_CORE_RD
Spoke with mom. August ran fever to 103F. Denies abdominal pain, flank pain, dysuria, nausea/vomiting, changes in urination. Discussed dropping off urine sample at urgent care this evening vs clinic tomorrow for urinalysis, urine culture. Discussed touching base with pediatrician if fever persists for additional fever work up as needed.     Urine orders placed.  
Cigars

## 2023-08-20 NOTE — H&P ADULT - PROBLEM SELECTOR PLAN 2
CT a/p: Incidental multiseptate left renal cystic lesion, 5 cm, indeterminate, possibly cystic neoplasm or complicated cyst.  - Uro consult Patient had 2 falls, possibly 2/2 to hypotension  - Fall precautions, bed alarm, chair alarm  - Check orthostatics  - PT bria

## 2023-08-20 NOTE — ED PROVIDER NOTE - OBJECTIVE STATEMENT
68-year-old male history of hypertension hyperlipidemia brought in by EMS for a fall found to be hypotensive has a history of his perirectal abscesses.  Denies any fever chills found to have a rectal temperature of 99.5.  Patient denies any upper respiratory symptoms.  Denies any nausea vomiting diarrhea.  Denies any abdominal pain. 68-year-old male history of hypertension hyperlipidemia brought in by EMS for a fall found to be hypotensive has a history of his perirectal abscesses.  Denies any fever chills found to have a rectal temperature of 99.5.  Patient denies any upper respiratory symptoms.  Denies any nausea vomiting diarrhea.  Denies any abdominal pain. Received 1 L NS from EMS.

## 2023-08-20 NOTE — H&P ADULT - ASSESSMENT
68 y.o. M with PMHx of HTN, HLD, presents to the ED after a fall found to be hypotensive. Admitted for perianal abscess.

## 2023-08-20 NOTE — H&P ADULT - HISTORY OF PRESENT ILLNESS
M/F PMH presents to ED for     Denies fever, chills, chest pain, palpitations, SOB, cough, abdominal pain, nausea, vomiting, diarrhea, constipation, hematochezia, melena, urinary frequency, urgency, dysuria, hematuria, headaches, changes in vision, dizziness, numbness, tingling. No other complaints at this time.    Denies recent travel, recent antibiotic use, or sick contacts.    ED course: Bedside I&D performed by Dr. Cabello   Vitals: BP: 95/58, HR: 70bpm, Temp: 98F, RR: 16, SpO2: 95% on RA   Labs significant for: H/H 12.8/37.8, Plt 142, PT 15.1, INR 1.30, BUN 25, Cr 1.60, Gluc 123, , eGFR 46   UA: Cloudy, + Ketone, + Protein, Trace Leukocyte esterase, Large blood, + WBC, present granular casts, present squamous epithelial cells   EKG: NSR, 77bpm   In ED given: Zosyn 3.375g IV x1, NS 1L bolus x1     Imaging  CT a/p: Perianal/ischioanal soft tissue are only partially imaged, no abscess seen.  Incidental multiseptate left renal cystic lesion, 5 cm, indeterminate, possibly cystic neoplasm or complicated cyst. Recommend outpatient follow-up with urology and dedicated contrast-enhanced renal mass protocol CT or MRI for further characterization. Comparison with any relevant imaging from an outside facility could be helpful.   68 y pmh of HTN, and HLD who presents for fall. Patient states that yesterday around 7pm, he woke up to use the restroom and feel in the bathroom. He had felt weak and could not get back up, but denies feeling dizzy, loss of consciousness, or any head trauma. His wife had tried to help him up but could not and called the police to help him back up. At 4 am, the patient's wife had walked to the living room where she saw him down on the floor sleeping. The patient states he did not feel dizzy, loss of consciousness or any head trauma prior to the second fall. He did not get back up because he was very tired. Patient denies any recent weight loss, night sweats, and fatigue.  Patient's last Bm was yesterday and was normal without blood.     Denies fever, chills, chest pain, palpitations, SOB, cough, abdominal pain, nausea, vomiting, diarrhea, constipation, hematochezia, melena, urinary frequency, urgency, dysuria, hematuria, headaches, changes in vision, dizziness, numbness, tingling. No other complaints at this time.    Denies recent travel, recent antibiotic use, or sick contacts.    ED course: Bedside I&D performed by Dr. Cabello   Vitals: BP: 95/58, HR: 70bpm, Temp: 98F, RR: 16, SpO2: 95% on RA   Labs significant for: H/H 12.8/37.8, Plt 142, PT 15.1, INR 1.30, BUN 25, Cr 1.60, Gluc 123, , eGFR 46   UA: Cloudy, + Ketone, + Protein, Trace Leukocyte esterase, Large blood, + WBC, present granular casts, present squamous epithelial cells   EKG: NSR, 77bpm   In ED given: Zosyn 3.375g IV x1, NS 1L bolus x1     Imaging  CT a/p: Perianal/ischioanal soft tissue are only partially imaged, no abscess seen.  Incidental multiseptate left renal cystic lesion, 5 cm, indeterminate, possibly cystic neoplasm or complicated cyst. Recommend outpatient follow-up with urology and dedicated contrast-enhanced renal mass protocol CT or MRI for further characterization. Comparison with any relevant imaging from an outside facility could be helpful.   68 y pmh of HTN, and HLD who presents for fall. Patient states that yesterday around 7pm, he woke up to use the restroom and feel in the bathroom. He had felt weak and could not get back up, but denies feeling dizzy, loss of consciousness, or any head trauma. His wife had tried to help him up but could not and called the police to help him back up. At 4 am, the patient's wife had walked to the living room where she saw him down on the floor sleeping. The patient states he did not feel dizzy, loss of consciousness or any head trauma prior to the second fall. He did not get back up because he was very tired. Patient denies any recent weight loss, night sweats, and fatigue.  Patient's last Bm was yesterday and was normal without blood.     Of note patient also reported the development of a perianal abscess for 1 week, and endorses that it possibly popped yesterday.    Denies fever, chills, chest pain, palpitations, SOB, cough, abdominal pain, nausea, vomiting, diarrhea, constipation, hematochezia, melena, urinary frequency, urgency, dysuria, hematuria, headaches, changes in vision, dizziness, numbness, tingling. No other complaints at this time.    Denies recent travel, recent antibiotic use, or sick contacts.    ED course: Bedside I&D performed by Dr. Cabello   Vitals: BP: 95/58, HR: 70bpm, Temp: 98F, RR: 16, SpO2: 95% on RA   Labs significant for: H/H 12.8/37.8, Plt 142, PT 15.1, INR 1.30, BUN 25, Cr 1.60, Gluc 123, , eGFR 46   UA: Cloudy, + Ketone, + Protein, Trace Leukocyte esterase, Large blood, + WBC, present granular casts, present squamous epithelial cells   EKG: NSR, 77bpm   In ED given: Zosyn 3.375g IV x1, NS 1L bolus x1     Imaging  CT a/p: Perianal/ischioanal soft tissue are only partially imaged, no abscess seen.  Incidental multiseptate left renal cystic lesion, 5 cm, indeterminate, possibly cystic neoplasm or complicated cyst. Recommend outpatient follow-up with urology and dedicated contrast-enhanced renal mass protocol CT or MRI for further characterization. Comparison with any relevant imaging from an outside facility could be helpful.

## 2023-08-20 NOTE — ED ADULT TRIAGE NOTE - CHIEF COMPLAINT QUOTE
per ems from home. pt has a rectal boil that wife believes popped tonight. pt has been hypotensive. pt receiving fluids by ems

## 2023-08-20 NOTE — CONSULT NOTE ADULT - NS PANP COMMENT GEN_ALL_CORE FT
Pt seen and examined  Presents s/p Fall at home x2.  Found at that time to have perianal abscess with necrosis or overlying skin measuring approximately 3 cm bullous with 5cm diameter area of induration extending along medial right buttock and peroneum.  CT scan failed to demonstrate any deep seated collection although incompletely imaged.  (see rad report).    Bedside I&D performed in ED.  Having discussed R/B/A to intervention with the patient and his wife he was appropriately position in right decubitus with the lesion in question in the dependent position.  1% Xylocaine local anesthetic field block was placed and a 15 blade scalpel was used to I&D the bullae.  Foul necrotic smelling brown purulent drainage exuded from wound.  Cultures obtained and sent for C&S.  The bullae was unroofed and abscess cavity digitized to break up underlying loculations, Measures approximately 2x3/2 cm.  Tracks toward the anus.  Feculent appearing particulate debris was flushed from the abscess cavity with Normal Saline.  1/2 inch iodoform packing was placed.  Dry 4x4 dressings applied and secured with silk tape.    Local wound care instructions reviewed with the patient and his wife.    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  Will consult Colorectal surgery as well to arrange outpatient follow up.    Pt to be admitted to medical service for IV antibiotics.  Will follow closely as he may require repeat washout or debridement.  Case management to arrange for [facility]NS upon discharge vs. Wound care Center follow up vs. SNF.

## 2023-08-20 NOTE — H&P ADULT - ATTENDING COMMENTS
68 y.o. M with PMHx of HTN, HLD, presents to the ED after a fall found to be hypotensive. Admitted for perianal abscess.     S/p I+ D by surgery team, started on zosyn, will cont, and obtain ID consult, f./u recs, f/u cultures, monitor WBC and fever curve.  Colorectal surgery also consulted.  Pt has renal cystic lesion, will f/u onc and renal recs, and will need out pt follow up and monitoring.  Pt has significant etoh use, with abnormal LFTs, AST higher than ALT, suggestive of alcohol related, will check hepatitis panel, and CIWA protocol.    Charlie Ortiz, Attending Physician

## 2023-08-20 NOTE — H&P ADULT - NSICDXFAMILYHX_GEN_ALL_CORE_FT
FAMILY HISTORY:  Father  Still living? Unknown  FH: chronic kidney disease, Age at diagnosis: Age Unknown  FH: HTN (hypertension), Age at diagnosis: Age Unknown

## 2023-08-21 LAB
A1C WITH ESTIMATED AVERAGE GLUCOSE RESULT: 6.2 % — HIGH (ref 4–5.6)
ALBUMIN SERPL ELPH-MCNC: 2.8 G/DL — LOW (ref 3.3–5)
ALP SERPL-CCNC: 55 U/L — SIGNIFICANT CHANGE UP (ref 40–120)
ALT FLD-CCNC: 90 U/L — HIGH (ref 12–78)
ANION GAP SERPL CALC-SCNC: 9 MMOL/L — SIGNIFICANT CHANGE UP (ref 5–17)
APTT BLD: 31.8 SEC — SIGNIFICANT CHANGE UP (ref 24.5–35.6)
AST SERPL-CCNC: 291 U/L — HIGH (ref 15–37)
BASOPHILS # BLD AUTO: 0.02 K/UL — SIGNIFICANT CHANGE UP (ref 0–0.2)
BASOPHILS NFR BLD AUTO: 0.3 % — SIGNIFICANT CHANGE UP (ref 0–2)
BILIRUB SERPL-MCNC: 0.9 MG/DL — SIGNIFICANT CHANGE UP (ref 0.2–1.2)
BUN SERPL-MCNC: 19 MG/DL — SIGNIFICANT CHANGE UP (ref 7–23)
CALCIUM SERPL-MCNC: 8.6 MG/DL — SIGNIFICANT CHANGE UP (ref 8.5–10.1)
CHLORIDE SERPL-SCNC: 107 MMOL/L — SIGNIFICANT CHANGE UP (ref 96–108)
CK SERPL-CCNC: 6391 U/L — HIGH (ref 26–308)
CO2 SERPL-SCNC: 26 MMOL/L — SIGNIFICANT CHANGE UP (ref 22–31)
CREAT SERPL-MCNC: 1.2 MG/DL — SIGNIFICANT CHANGE UP (ref 0.5–1.3)
CULTURE RESULTS: NO GROWTH — SIGNIFICANT CHANGE UP
EGFR: 65 ML/MIN/1.73M2 — SIGNIFICANT CHANGE UP
EOSINOPHIL # BLD AUTO: 0.12 K/UL — SIGNIFICANT CHANGE UP (ref 0–0.5)
EOSINOPHIL NFR BLD AUTO: 1.5 % — SIGNIFICANT CHANGE UP (ref 0–6)
ESTIMATED AVERAGE GLUCOSE: 131 MG/DL — HIGH (ref 68–114)
FERRITIN SERPL-MCNC: 522 NG/ML — HIGH (ref 30–400)
FOLATE SERPL-MCNC: 2.6 NG/ML — LOW
GLUCOSE SERPL-MCNC: 89 MG/DL — SIGNIFICANT CHANGE UP (ref 70–99)
HAV IGM SER-ACNC: SIGNIFICANT CHANGE UP
HBV CORE IGM SER-ACNC: SIGNIFICANT CHANGE UP
HBV SURFACE AG SER-ACNC: SIGNIFICANT CHANGE UP
HCT VFR BLD CALC: 36.7 % — LOW (ref 39–50)
HCV AB S/CO SERPL IA: 0.08 S/CO — SIGNIFICANT CHANGE UP (ref 0–0.99)
HCV AB SERPL-IMP: SIGNIFICANT CHANGE UP
HGB BLD-MCNC: 12.4 G/DL — LOW (ref 13–17)
IMM GRANULOCYTES NFR BLD AUTO: 0.3 % — SIGNIFICANT CHANGE UP (ref 0–0.9)
INR BLD: 1.11 RATIO — SIGNIFICANT CHANGE UP (ref 0.85–1.18)
IRON SATN MFR SERPL: 12 % — LOW (ref 16–55)
IRON SATN MFR SERPL: 20 UG/DL — LOW (ref 45–165)
LYMPHOCYTES # BLD AUTO: 1.04 K/UL — SIGNIFICANT CHANGE UP (ref 1–3.3)
LYMPHOCYTES # BLD AUTO: 13.1 % — SIGNIFICANT CHANGE UP (ref 13–44)
MCHC RBC-ENTMCNC: 33.3 PG — SIGNIFICANT CHANGE UP (ref 27–34)
MCHC RBC-ENTMCNC: 33.8 GM/DL — SIGNIFICANT CHANGE UP (ref 32–36)
MCV RBC AUTO: 98.7 FL — SIGNIFICANT CHANGE UP (ref 80–100)
MONOCYTES # BLD AUTO: 0.42 K/UL — SIGNIFICANT CHANGE UP (ref 0–0.9)
MONOCYTES NFR BLD AUTO: 5.3 % — SIGNIFICANT CHANGE UP (ref 2–14)
NEUTROPHILS # BLD AUTO: 6.29 K/UL — SIGNIFICANT CHANGE UP (ref 1.8–7.4)
NEUTROPHILS NFR BLD AUTO: 79.5 % — HIGH (ref 43–77)
NRBC # BLD: 0 /100 WBCS — SIGNIFICANT CHANGE UP (ref 0–0)
PLATELET # BLD AUTO: 145 K/UL — LOW (ref 150–400)
POTASSIUM SERPL-MCNC: 3.6 MMOL/L — SIGNIFICANT CHANGE UP (ref 3.5–5.3)
POTASSIUM SERPL-SCNC: 3.6 MMOL/L — SIGNIFICANT CHANGE UP (ref 3.5–5.3)
PROT SERPL-MCNC: 6.6 G/DL — SIGNIFICANT CHANGE UP (ref 6–8.3)
PROTHROM AB SERPL-ACNC: 13 SEC — SIGNIFICANT CHANGE UP (ref 9.5–13)
RBC # BLD: 3.72 M/UL — LOW (ref 4.2–5.8)
RBC # FLD: 12.9 % — SIGNIFICANT CHANGE UP (ref 10.3–14.5)
SODIUM SERPL-SCNC: 142 MMOL/L — SIGNIFICANT CHANGE UP (ref 135–145)
SPECIMEN SOURCE: SIGNIFICANT CHANGE UP
TIBC SERPL-MCNC: 168 UG/DL — LOW (ref 220–430)
UIBC SERPL-MCNC: 147 UG/DL — SIGNIFICANT CHANGE UP (ref 110–370)
VIT B12 SERPL-MCNC: 276 PG/ML — SIGNIFICANT CHANGE UP (ref 232–1245)
WBC # BLD: 7.91 K/UL — SIGNIFICANT CHANGE UP (ref 3.8–10.5)
WBC # FLD AUTO: 7.91 K/UL — SIGNIFICANT CHANGE UP (ref 3.8–10.5)

## 2023-08-21 PROCEDURE — 99233 SBSQ HOSP IP/OBS HIGH 50: CPT

## 2023-08-21 PROCEDURE — 99024 POSTOP FOLLOW-UP VISIT: CPT

## 2023-08-21 PROCEDURE — 99222 1ST HOSP IP/OBS MODERATE 55: CPT

## 2023-08-21 RX ORDER — HYDROGEN PEROXIDE 0.3 KG/100L
1 LIQUID TOPICAL DAILY
Refills: 0 | Status: DISCONTINUED | OUTPATIENT
Start: 2023-08-21 | End: 2023-08-23

## 2023-08-21 RX ADMIN — Medication 100 MILLIGRAM(S): at 11:22

## 2023-08-21 RX ADMIN — Medication 1 MILLIGRAM(S): at 11:22

## 2023-08-21 RX ADMIN — PIPERACILLIN AND TAZOBACTAM 25 GRAM(S): 4; .5 INJECTION, POWDER, LYOPHILIZED, FOR SOLUTION INTRAVENOUS at 21:07

## 2023-08-21 RX ADMIN — PIPERACILLIN AND TAZOBACTAM 25 GRAM(S): 4; .5 INJECTION, POWDER, LYOPHILIZED, FOR SOLUTION INTRAVENOUS at 14:55

## 2023-08-21 RX ADMIN — Medication 1 TABLET(S): at 11:22

## 2023-08-21 RX ADMIN — Medication 1 PATCH: at 11:21

## 2023-08-21 RX ADMIN — ATORVASTATIN CALCIUM 40 MILLIGRAM(S): 80 TABLET, FILM COATED ORAL at 21:07

## 2023-08-21 RX ADMIN — HYDROGEN PEROXIDE 1 APPLICATION(S): 0.3 LIQUID TOPICAL at 12:15

## 2023-08-21 RX ADMIN — Medication 1 PATCH: at 19:00

## 2023-08-21 RX ADMIN — PIPERACILLIN AND TAZOBACTAM 25 GRAM(S): 4; .5 INJECTION, POWDER, LYOPHILIZED, FOR SOLUTION INTRAVENOUS at 05:26

## 2023-08-21 NOTE — PHYSICAL THERAPY INITIAL EVALUATION ADULT - GAIT TRAINING, PT EVAL
Patient will ambulate independently for 200 feet to be able to negotiate home environment, within 2 to 3 sessions.

## 2023-08-21 NOTE — PROGRESS NOTE ADULT - PROBLEM SELECTOR PLAN 1
CT a/p: Perianal/ischioanal soft tissue are only partially imaged, no abscess seen.  - I&D performed in ED by Dr. Cabello   - Zosyn 3.375g IV x1 in ED   - Continue Zosyn 3.375G IV q8h   - f/u BC and abscess culture  - trend WBCs  - ID (Dr. Ying) consulted. Appreciate recommendations   - Surgery (Dr. Cabello) following

## 2023-08-21 NOTE — PHYSICAL THERAPY INITIAL EVALUATION ADULT - GENERAL OBSERVATIONS, REHAB EVAL
Patient chart reviewed, events noted. Patient received semi-reclined in bed, +IV antibiotics (RN present to disconnect prior to mobility), wife at bedside, in NAD. Agreeable to PT at this time.

## 2023-08-21 NOTE — PROGRESS NOTE ADULT - ASSESSMENT
MYRA: Prerenal azotemia, Hemodynamic ATN  Hypotension  Perirectal abscess    Improved renal indices. IV abx, ID follow up. Surgery follow up. To continue current meds.   Monitor BP trend. Will follow electrolytes and renal function trend.

## 2023-08-21 NOTE — PHYSICAL THERAPY INITIAL EVALUATION ADULT - PERTINENT HX OF CURRENT PROBLEM, REHAB EVAL
Per EMR, "68 y.o. M with PMHx of HTN, HLD, presents to the ED after a fall found to be hypotensive. Admitted for perianal abscess."

## 2023-08-21 NOTE — PHYSICAL THERAPY INITIAL EVALUATION ADULT - BED MOBILITY TRAINING, PT EVAL
Patient will perform supine<>sit independently to be able to reposition in bed and maintain skin integrity, within 2 to 3 sessions.

## 2023-08-21 NOTE — PHYSICAL THERAPY INITIAL EVALUATION ADULT - LIVES WITH, PROFILE
Pt lives with his wife in a private home, 4 steps to enter front door with columns along edge/no rail, 4 to 5 ROEBRT back door with rail; pt was Independent with mobility PTA, mild SOB (smoker), no AD or DME in home. Pt denies loss of balance prior to admission.

## 2023-08-21 NOTE — PHYSICAL THERAPY INITIAL EVALUATION ADULT - NSACTIVITYREC_GEN_A_PT
OOB to chair with supervision; ambulate to bathroom with supervision; ambulate in hallway with supervision; home exercise program.

## 2023-08-21 NOTE — PHYSICAL THERAPY INITIAL EVALUATION ADULT - BALANCE TRAINING, PT EVAL
Patient will demonstrate good minus balance to be able to perform functional mobility effectively, within 2 to 3 sessions.

## 2023-08-21 NOTE — CONSULT NOTE ADULT - SUBJECTIVE AND OBJECTIVE BOX
Chief Complaint:  Patient is a 68y old  Male who presents with a chief complaint of Perianal abscess   68 y pmh of HTN, and HLD who presents for fall. Patient states that yesterday around 7pm, he woke up to use the restroom and feel in the bathroom. He had felt weak and could not get back up, but denies feeling dizzy, loss of consciousness, or any head trauma. His wife had tried to help him up but could not and called the police to help him back up. At 4 am, the patient's wife had walked to the living room where she saw him down on the floor sleeping. The patient states he did not feel dizzy, loss of consciousness or any head trauma prior to the second fall. He did not get back up because he was very tired. Patient denies any recent weight loss, night sweats, and fatigue.  Patient's last Bm was yesterday and was normal without blood.     Of note patient also reported the development of a perianal abscess for 1 week, and endorses that it possibly popped yesterday.    Denies fever, chills, chest pain, palpitations, SOB, cough, abdominal pain, nausea, vomiting, diarrhea, constipation, hematochezia, melena, urinary frequency, urgency, dysuria, hematuria, headaches, changes in vision, dizziness, numbness, tingling. No other complaints at this time.    Denies recent travel, recent antibiotic use, or sick contacts.    ED course: Bedside I&D performed by Dr. Cabello   Vitals: BP: 95/58, HR: 70bpm, Temp: 98F, RR: 16, SpO2: 95% on RA   Labs significant for: H/H 12.8/37.8, Plt 142, PT 15.1, INR 1.30, BUN 25, Cr 1.60, Gluc 123, , eGFR 46   UA: Cloudy, + Ketone, + Protein, Trace Leukocyte esterase, Large blood, + WBC, present granular casts, present squamous epithelial cells   EKG: NSR, 77bpm   In ED given: Zosyn 3.375g IV x1, NS 1L bolus x1     Imaging  CT a/p: Perianal/ischioanal soft tissue are only partially imaged, no abscess seen.  Incidental multiseptate left renal cystic lesion, 5 cm, indeterminate, possibly cystic neoplasm or complicated cyst. Recommend outpatient follow-up with urology and dedicated contrast-enhanced renal mass protocol CT or MRI for further characterization. Comparison with any relevant imaging from an outside facility could be helpful.         Review of Systems:  Review of Systems: CONSTITUTIONAL: denies fever, chills, diaphoresis, fatigue, weakness, dizziness, lightheadedness  HEENT: denies blurred vision, sore throat, cough  SKIN: denies new lesions, rash, hives, itching  CARDIOVASCULAR: denies chest pain, chest pressure, palpitations  RESPIRATORY: denies shortness of breath, DÍAZ, wheezing, sputum production  GASTROINTESTINAL: denies nausea, vomiting, diarrhea, constipation, abdominal pain, hematochezia, melena  GENITOURINARY: denies dysuria, polyuria, hematuria, discharge  NEUROLOGICAL: denies syncope, LOC, numbness, headache, focal weakness  MUSCULOSKELETAL: denies new joint pain, joint swelling, muscle aches  HEMATOLOGIC: denies gross bleeding, bruising  LYMPHATICS: denies enlarged lymph nodes, extremity swelling  PSYCHIATRIC: denies recent changes in anxiety, depression  ENDOCRINOLOGIC: denies sweating, cold or heat intolerance    Allergies:  No Known Allergies      Medications:  acetaminophen     Tablet .. 650 milliGRAM(s) Oral every 6 hours PRN  aluminum hydroxide/magnesium hydroxide/simethicone Suspension 30 milliLiter(s) Oral every 4 hours PRN  atorvastatin 40 milliGRAM(s) Oral at bedtime  folic acid 1 milliGRAM(s) Oral daily  hydrogen peroxide 3% Solution 1 Application(s) Topical daily  LORazepam     Tablet 1 milliGRAM(s) Oral every 2 hours PRN  LORazepam     Tablet 1 milliGRAM(s) Oral every 1 hour PRN  melatonin 3 milliGRAM(s) Oral at bedtime PRN  multivitamin 1 Tablet(s) Oral daily  nicotine -  14 mG/24Hr(s) Patch 1 Patch Transdermal daily  piperacillin/tazobactam IVPB.- 3.375 Gram(s) IV Intermittent once  piperacillin/tazobactam IVPB.. 3.375 Gram(s) IV Intermittent every 8 hours  thiamine 100 milliGRAM(s) Oral daily      PMHX/PSHX:  HLD (hyperlipidemia)    HTN (hypertension)        Family history:  FH: chronic kidney disease (Father)    FH: HTN (hypertension) (Father)        Social History:   daily etoh use for 40 years drinks some vodka  no oivda no cigs  retired    ROS:     General:  No wt loss, fevers, chills, night sweats, fatigue,   Eyes:  Good vision, no reported pain  ENT:  No sore throat, pain, runny nose, dysphagia  CV:  No pain, palpitations, hypo/hypertension  Resp:  No dyspnea, cough, tachypnea, wheezing  GI:  No pain, No nausea, No vomiting, No diarrhea, No constipation, No weight loss, No fever, No pruritis, No rectal bleeding, No tarry stools, No dysphagia,  :  No pain, bleeding, incontinence, nocturia  Muscle:  No pain, weakness  Neuro:  No weakness, tingling, memory problems  Psych:  No fatigue, insomnia, mood problems, depression  Endocrine:  No polyuria, polydipsia, cold/heat intolerance  Heme:  No petechiae, ecchymosis, easy bruisability  Skin:  No rash, tattoos, scars, edema      PHYSICAL EXAM:   Vital Signs:  Vital Signs Last 24 Hrs  T(C): 36.6 (21 Aug 2023 12:58), Max: 36.8 (20 Aug 2023 16:56)  T(F): 97.9 (21 Aug 2023 12:58), Max: 98.2 (20 Aug 2023 16:56)  HR: 72 (21 Aug 2023 12:58) (72 - 78)  BP: 107/62 (21 Aug 2023 12:58) (100/62 - 115/71)  BP(mean): --  RR: 19 (21 Aug 2023 12:58) (17 - 20)  SpO2: 93% (21 Aug 2023 12:58) (91% - 96%)    Parameters below as of 21 Aug 2023 12:58  Patient On (Oxygen Delivery Method): room air      Daily     Daily     GENERAL:  Appears stated age, well-groomed, well-nourished, no distress  HEENT:  NC/AT,  conjunctivae clear and pink, no thyromegaly, nodules, adenopathy, no JVD, sclera -anicteric  CHEST:  Full & symmetric excursion, no increased effort, breath sounds clear  HEART:  Regular rhythm, S1, S2, no murmur/rub/S3/S4, no abdominal bruit, no edema  ABDOMEN:  Soft, non-tender, non-distended, normoactive bowel sounds,  no masses ,no hepato-splenomegaly, no signs of chronic liver disease  EXTEREMITIES:  no cyanosis,clubbing or edema  SKIN:  No rash/erythema/ecchymoses/petechiae/wounds/abscess/warm/dry  NEURO:  Alert, oriented, no asterixis, no tremor, no encephalopathy    LABS:                        12.4   7.91  )-----------( 145      ( 21 Aug 2023 06:50 )             36.7     08-21    142  |  107  |  19  ----------------------------<  89  3.6   |  26  |  1.20    Ca    8.6      21 Aug 2023 06:50    TPro  6.6  /  Alb  2.8<L>  /  TBili  0.9  /  DBili  x   /  AST  291<H>  /  ALT  90<H>  /  AlkPhos  55  08-21    LIVER FUNCTIONS - ( 21 Aug 2023 06:50 )  Alb: 2.8 g/dL / Pro: 6.6 g/dL / ALK PHOS: 55 U/L / ALT: 90 U/L / AST: 291 U/L / GGT: x           PT/INR - ( 21 Aug 2023 06:50 )   PT: 13.0 sec;   INR: 1.11 ratio         PTT - ( 21 Aug 2023 06:50 )  PTT:31.8 sec  Urinalysis Basic - ( 21 Aug 2023 06:50 )    Color: x / Appearance: x / SG: x / pH: x  Gluc: 89 mg/dL / Ketone: x  / Bili: x / Urobili: x   Blood: x / Protein: x / Nitrite: x   Leuk Esterase: x / RBC: x / WBC x   Sq Epi: x / Non Sq Epi: x / Bacteria: x          Imaging:

## 2023-08-21 NOTE — PHYSICAL THERAPY INITIAL EVALUATION ADULT - NSPTDISCHREC_GEN_A_CORE
no skilled PT needs anticipated at discharge; pt given supervision with bed mobility and ambulation due to hospital setting, Independent at baseline without use of AD for mobility

## 2023-08-21 NOTE — PROGRESS NOTE ADULT - ASSESSMENT
69 y/o male BIBA s/p fall with history of perirectal abscess, s/p I&D 8/20.    PLAN:  - Care per primary team  - Daily packing changes  - Continue antibiotics  - Pain control as needed  - Trend daily labs  - Reached out to colorectal surgeon (Dr. Hooker) - pt follow up outpatient - please include office information on discharge paperwork  - Discussed with Dr. Cabello    Surgical Team  Spectralink: [id number]

## 2023-08-21 NOTE — CONSULT NOTE ADULT - SUBJECTIVE AND OBJECTIVE BOX
[facility]  INFECTIOUS DISEASES   [address] [place] [address]  Tel: [phone]     Fax: [phone]  ========================================================  MD Michael Chester Kaushal, MD Cho, Michelle, MD Sunjit, Jaspal, MD  ========================================================    [id number]  ASAF MEEKS     CC: fall    HPI:  67 yo man with PMH of HTN was admitted after a fall. He fell in bathroom and couldn't get up but not feeling dizzy, loss of consciousness, or any head trauma. His wife at the bedside.   He started that for one week he felt a small anal abscess. No fever or chills. Seen in ED by surgery and had a bedside I&D.   Cultures have been sent and started on zosyn.   Currently feels fine, but not on antihypertensive with mild hypotension so trying to find out if fall is related to infection and possible sepsis or not.     PAST MEDICAL & SURGICAL HISTORY:  HLD (hyperlipidemia)  HTN (hypertension)    Social Hx: +smoking used to smoke 1/2 pack for years, lately smokes cigar, + EtOH nightly  2-3 vodka drink, no drugs     FAMILY HISTORY:  FH: chronic kidney disease (Father)    FH: HTN (hypertension) (Father)    Allergies  No Known Allergies    Antibiotics:  MEDICATIONS  (STANDING):  atorvastatin 40 milliGRAM(s) Oral at bedtime  folic acid 1 milliGRAM(s) Oral daily  hydrogen peroxide 3% Solution 1 Application(s) Topical daily  multivitamin 1 Tablet(s) Oral daily  nicotine -  14 mG/24Hr(s) Patch 1 Patch Transdermal daily  piperacillin/tazobactam IVPB.- 3.375 Gram(s) IV Intermittent once  piperacillin/tazobactam IVPB.. 3.375 Gram(s) IV Intermittent every 8 hours  sodium chloride 0.9%. 1000 milliLiter(s) (75 mL/Hr) IV Continuous <Continuous>  thiamine 100 milliGRAM(s) Oral daily    MEDICATIONS  (PRN):  acetaminophen     Tablet .. 650 milliGRAM(s) Oral every 6 hours PRN Temp greater or equal to 38C (100.4F), Mild Pain (1 - 3)  aluminum hydroxide/magnesium hydroxide/simethicone Suspension 30 milliLiter(s) Oral every 4 hours PRN Dyspepsia  LORazepam     Tablet 1 milliGRAM(s) Oral every 2 hours PRN CIWA-Ar score increase by 2 points and a total score of 7 or less  LORazepam     Tablet 1 milliGRAM(s) Oral every 1 hour PRN CIWA-Ar score 8 or greater  melatonin 3 milliGRAM(s) Oral at bedtime PRN Insomnia     REVIEW OF SYSTEMS:  CONSTITUTIONAL:  No Fever or chills  HEENT:  No diplopia or blurred vision.  No sore throat or runny nose.  CARDIOVASCULAR:  No chest pain or SOB.  RESPIRATORY:  No cough, shortness of breath, PND or orthopnea.  GASTROINTESTINAL:  No nausea, vomiting or diarrhea. mild pain in anal area  GENITOURINARY:  No dysuria, frequency or urgency. No Blood in urine  MUSCULOSKELETAL:  no joint aches, no muscle pain  SKIN:  No change in skin, hair or nails.  NEUROLOGIC:  No paresthesias or weakness.  PSYCHIATRIC:  No disorder of thought or mood.  ENDOCRINE:  No heat or cold intolerance, polyuria or polydipsia.  HEMATOLOGICAL:  No easy bruising or bleeding.     Physical Exam:  Vital Signs Last 24 Hrs  T(C): 36.8 (21 Aug 2023 05:25), Max: 36.8 (20 Aug 2023 16:56)  T(F): 98.2 (21 Aug 2023 05:25), Max: 98.2 (20 Aug 2023 16:56)  HR: 76 (21 Aug 2023 05:25) (70 - 78)  BP: 100/62 (21 Aug 2023 05:25) (95/58 - 115/71)  BP(mean): --  RR: 20 (21 Aug 2023 05:25) (16 - 20)  SpO2: 95% (21 Aug 2023 05:25) (91% - 96%)  Parameters below as of 21 Aug 2023 05:25  Patient On (Oxygen Delivery Method): room air  GEN: NAD, obese   HEENT: normocephalic and atraumatic. EOMI. PERRL.    NECK: Supple.  No lymphadenopathy   LUNGS: Clear to auscultation.  HEART: Regular rate and rhythm without murmur.  ABDOMEN: Soft, nontender, and nondistended.  Positive bowel sounds.    : No CVA tenderness, perianal skin with induration and open with gauze packed  minimal bleeding  EXTREMITIES: +edema.  NEUROLOGIC: grossly intact.  PSYCHIATRIC: Appropriate affect .  SKIN: No rash     Labs:      139  |  106  |  25<H>  ----------------------------<  123<H>  3.5   |  25  |  1.60<H>    Ca    8.7      20 Aug 2023 06:10    TPro  7.1  /  Alb  3.0<L>  /  TBili  0.9  /  DBili  x   /  AST  227<H>  /  ALT  69  /  AlkPhos  55                          12.4   7.91  )-----------( x        ( 21 Aug 2023 06:50 )             36.7     PT/INR - ( 21 Aug 2023 06:50 )   PT: 13.0 sec;   INR: 1.11 ratio    PTT - ( 21 Aug 2023 06:50 )  PTT:31.8 sec  Urinalysis Basic - ( 20 Aug 2023 09:09 )    Color: Dark Yellow / Appearance: Cloudy / S.037 / pH: x  Gluc: x / Ketone: 15 mg/dL  / Bili: Negative / Urobili: 1.0 mg/dL   Blood: x / Protein: 100 mg/dL / Nitrite: Negative   Leuk Esterase: Trace / RBC: 2 /HPF / WBC 15 /HPF   Sq Epi: x / Non Sq Epi: x / Bacteria: x    LIVER FUNCTIONS - ( 20 Aug 2023 06:10 )  Alb: 3.0 g/dL / Pro: 7.1 g/dL / ALK PHOS: 55 U/L / ALT: 69 U/L / AST: 227 U/L / GGT: x           All imaging and other data have been reviewed.  CT a/p : Perianal/ischioanal soft tissue are only partially imaged, no abscess seen.  Incidental multiseptate left renal cystic lesion, 5 cm, indeterminate, possibly cystic neoplasm or complicated cyst. Recommend outpatient follow-up with urology and dedicated contrast-enhanced renal mass protocol CT or MRI for further characterization. Comparison with any relevant imaging from an outside facility could be helpful.    Assessment and Plan:   67 yo man with PMH of HTN was admitted after a fall. He fell in bathroom and couldn't get up but not feeling dizzy, loss of consciousness, or any head trauma. His wife at the bedside.   He started that for one week he felt a small anal abscess. No fever or chills. Seen in ED by surgery and had a bedside I&D.     Infection seems contained, less likely sepsis, less likely hypotension is related to infection but will rule out bacteremia.     # Perianal abscess  # fall   - Will follow blood cultures   - Will follow culture sent after I&D  - Will monitor Tmax and WBC  - Continue zosyn for now   - Surgery follow up noted  - Fall work up as per primary team   - Counseling for smoking and drinking     Thank you for courtesy of this consult.     Will follow.  Discussed with the primary team.     Fredy Ynig MD  Division of Infectious Diseases   Please call ID service at [phone] with any question.    75 minutes spent on total encounter assessing patient, examination, chart review, counseling and coordinating care by the attending physician/nurse/care manager.

## 2023-08-21 NOTE — PHYSICAL THERAPY INITIAL EVALUATION ADULT - TRANSFER TRAINING, PT EVAL
Patient will perform stand<>sit independently to be able to get up and use the bathroom, within 2 to 3 sessions.

## 2023-08-21 NOTE — CONSULT NOTE ADULT - ASSESSMENT
69 yo man smoker 1/2ppd, drinker 1/2pt vodka a day w hx HTN HLD, brought to hospital as had fallen in bathroom and could not get back up due to weakness, found by wife early AM.  Also reports 1wk hx perianal abscess which he thinks popped the day prior to adm  Denies fever, chills, cough, blood in urine/stool, abdomen or flank/back pain, anorexai weight loss  In ER, BP 85/58  post I&D perianal abscess by Surgery  UA cloudy w tr leuk esterase and 15 WBC(2RBC  CT a/p w con show left lower pole renal complex cyst w multi thin internal septation, up to 4.5x4.5x5cm, w nonsp shamar symmetric perinephric fat stranding, no retroperitoneal LADs. Radiology feels c/w eigher cystic neoplasm or complicated cyst and recommends outpatient Urology eval, CT renal mass protocol, or MRI      -no strong suspicion for malignancy based on present CT a/p description of left renal septated cyst morphology, recommend Urology evaluation. Present BUN/Cr 25/1.6 and pt already had CT w contrast, prob should wait until renal function improves to have another IV contrast load(Renal on case)  -if malignancy found, isolated renal ca is usually treated surgically by Urology      discussed w pt  thank you, please call if any questions  
etoh abuse  inc lfts  rectal abscess    plan  iv abs  Avoid all non-essential hepatotoxic drugs	  Obtain Abdominal Ultrasound  Check viral hepatitis panel  Diet and weight control discussed    Monitor daily liver function test  
67 yo M BIBA s/p fall with history of perirectal abscess. WC wnl, BUN/Cr elevated likely 2/2 dehydration. UA positive.     Plan:  - CT scan to be repeated, pelvic scan does not extend low enough   - IVF resuscitation   - To be reassessed after CT

## 2023-08-21 NOTE — PROGRESS NOTE ADULT - PROBLEM SELECTOR PLAN 3
CT a/p: Incidental multiseptate left renal cystic lesion, 5 cm, indeterminate, possibly cystic neoplasm or complicated cyst.  - Hemo onc (Dr. Dee) consulted, f/u recs  - Renal (Dr. Campbell) consult, f/u recs

## 2023-08-22 LAB
ALBUMIN SERPL ELPH-MCNC: 2.7 G/DL — LOW (ref 3.3–5)
ALP SERPL-CCNC: 51 U/L — SIGNIFICANT CHANGE UP (ref 40–120)
ALT FLD-CCNC: 94 U/L — HIGH (ref 12–78)
ANION GAP SERPL CALC-SCNC: 5 MMOL/L — SIGNIFICANT CHANGE UP (ref 5–17)
AST SERPL-CCNC: 237 U/L — HIGH (ref 15–37)
BASOPHILS # BLD AUTO: 0.03 K/UL — SIGNIFICANT CHANGE UP (ref 0–0.2)
BASOPHILS NFR BLD AUTO: 0.5 % — SIGNIFICANT CHANGE UP (ref 0–2)
BILIRUB SERPL-MCNC: 0.6 MG/DL — SIGNIFICANT CHANGE UP (ref 0.2–1.2)
BUN SERPL-MCNC: 15 MG/DL — SIGNIFICANT CHANGE UP (ref 7–23)
CALCIUM SERPL-MCNC: 8.8 MG/DL — SIGNIFICANT CHANGE UP (ref 8.5–10.1)
CHLORIDE SERPL-SCNC: 110 MMOL/L — HIGH (ref 96–108)
CO2 SERPL-SCNC: 25 MMOL/L — SIGNIFICANT CHANGE UP (ref 22–31)
CREAT SERPL-MCNC: 1.1 MG/DL — SIGNIFICANT CHANGE UP (ref 0.5–1.3)
EGFR: 72 ML/MIN/1.73M2 — SIGNIFICANT CHANGE UP
EOSINOPHIL # BLD AUTO: 0.25 K/UL — SIGNIFICANT CHANGE UP (ref 0–0.5)
EOSINOPHIL NFR BLD AUTO: 3.9 % — SIGNIFICANT CHANGE UP (ref 0–6)
GI PCR PANEL: SIGNIFICANT CHANGE UP
GLUCOSE SERPL-MCNC: 100 MG/DL — HIGH (ref 70–99)
HCT VFR BLD CALC: 37.6 % — LOW (ref 39–50)
HGB BLD-MCNC: 12.5 G/DL — LOW (ref 13–17)
IMM GRANULOCYTES NFR BLD AUTO: 0.3 % — SIGNIFICANT CHANGE UP (ref 0–0.9)
LYMPHOCYTES # BLD AUTO: 1.21 K/UL — SIGNIFICANT CHANGE UP (ref 1–3.3)
LYMPHOCYTES # BLD AUTO: 18.9 % — SIGNIFICANT CHANGE UP (ref 13–44)
MCHC RBC-ENTMCNC: 33.2 GM/DL — SIGNIFICANT CHANGE UP (ref 32–36)
MCHC RBC-ENTMCNC: 33.4 PG — SIGNIFICANT CHANGE UP (ref 27–34)
MCV RBC AUTO: 100.5 FL — HIGH (ref 80–100)
MONOCYTES # BLD AUTO: 0.46 K/UL — SIGNIFICANT CHANGE UP (ref 0–0.9)
MONOCYTES NFR BLD AUTO: 7.2 % — SIGNIFICANT CHANGE UP (ref 2–14)
NEUTROPHILS # BLD AUTO: 4.42 K/UL — SIGNIFICANT CHANGE UP (ref 1.8–7.4)
NEUTROPHILS NFR BLD AUTO: 69.2 % — SIGNIFICANT CHANGE UP (ref 43–77)
NRBC # BLD: 0 /100 WBCS — SIGNIFICANT CHANGE UP (ref 0–0)
PLATELET # BLD AUTO: 148 K/UL — LOW (ref 150–400)
POTASSIUM SERPL-MCNC: 3.6 MMOL/L — SIGNIFICANT CHANGE UP (ref 3.5–5.3)
POTASSIUM SERPL-SCNC: 3.6 MMOL/L — SIGNIFICANT CHANGE UP (ref 3.5–5.3)
PROT SERPL-MCNC: 6.8 G/DL — SIGNIFICANT CHANGE UP (ref 6–8.3)
RBC # BLD: 3.74 M/UL — LOW (ref 4.2–5.8)
RBC # FLD: 12.8 % — SIGNIFICANT CHANGE UP (ref 10.3–14.5)
SODIUM SERPL-SCNC: 140 MMOL/L — SIGNIFICANT CHANGE UP (ref 135–145)
WBC # BLD: 6.39 K/UL — SIGNIFICANT CHANGE UP (ref 3.8–10.5)
WBC # FLD AUTO: 6.39 K/UL — SIGNIFICANT CHANGE UP (ref 3.8–10.5)

## 2023-08-22 PROCEDURE — 99233 SBSQ HOSP IP/OBS HIGH 50: CPT

## 2023-08-22 PROCEDURE — 76705 ECHO EXAM OF ABDOMEN: CPT | Mod: 26

## 2023-08-22 PROCEDURE — 99232 SBSQ HOSP IP/OBS MODERATE 35: CPT

## 2023-08-22 RX ORDER — ENOXAPARIN SODIUM 100 MG/ML
40 INJECTION SUBCUTANEOUS
Refills: 0 | Status: DISCONTINUED | OUTPATIENT
Start: 2023-08-22 | End: 2023-08-23

## 2023-08-22 RX ORDER — LACTOBACILLUS ACIDOPHILUS 100MM CELL
1 CAPSULE ORAL
Refills: 0 | Status: DISCONTINUED | OUTPATIENT
Start: 2023-08-22 | End: 2023-08-23

## 2023-08-22 RX ADMIN — ATORVASTATIN CALCIUM 40 MILLIGRAM(S): 80 TABLET, FILM COATED ORAL at 21:01

## 2023-08-22 RX ADMIN — PIPERACILLIN AND TAZOBACTAM 25 GRAM(S): 4; .5 INJECTION, POWDER, LYOPHILIZED, FOR SOLUTION INTRAVENOUS at 05:21

## 2023-08-22 RX ADMIN — PIPERACILLIN AND TAZOBACTAM 25 GRAM(S): 4; .5 INJECTION, POWDER, LYOPHILIZED, FOR SOLUTION INTRAVENOUS at 14:57

## 2023-08-22 RX ADMIN — ENOXAPARIN SODIUM 40 MILLIGRAM(S): 100 INJECTION SUBCUTANEOUS at 11:05

## 2023-08-22 RX ADMIN — Medication 100 MILLIGRAM(S): at 11:08

## 2023-08-22 RX ADMIN — Medication 1 PATCH: at 19:30

## 2023-08-22 RX ADMIN — PIPERACILLIN AND TAZOBACTAM 25 GRAM(S): 4; .5 INJECTION, POWDER, LYOPHILIZED, FOR SOLUTION INTRAVENOUS at 21:00

## 2023-08-22 RX ADMIN — Medication 1 PATCH: at 11:05

## 2023-08-22 RX ADMIN — Medication 1 PATCH: at 07:56

## 2023-08-22 RX ADMIN — Medication 1 PATCH: at 11:14

## 2023-08-22 RX ADMIN — Medication 1 TABLET(S): at 11:04

## 2023-08-22 RX ADMIN — Medication 1 TABLET(S): at 17:16

## 2023-08-22 RX ADMIN — HYDROGEN PEROXIDE 1 APPLICATION(S): 0.3 LIQUID TOPICAL at 11:01

## 2023-08-22 RX ADMIN — Medication 1 MILLIGRAM(S): at 11:05

## 2023-08-22 NOTE — PROGRESS NOTE ADULT - ASSESSMENT
etoh abuse  inc lfts  rectal abscess    iv abs  Avoid all non-essential hepatotoxic drugs	  Follow US RUQ  Hepatitis panel neg  Diet and weight control discussed    Monitor daily liver function test  GI PCR  d/w pt and wife  Will follow    I reviewed the overnight course of events on the unit, re-confirming the patient history. I discussed the care with the patient and their family  Differential diagnosis and plan of care discussed with patient after the evaluation  40 minutes spent on total encounter of which more than fifty percent of the encounter was spent counseling and/or coordinating care by the attending physician.  Advanced care planning was discussed with patient and family.  Advanced care planning forms were reviewed and discussed.  Risks, benefits and alternatives of gastroenterologic procedures were discussed in detail and all questions were answered.

## 2023-08-22 NOTE — PROGRESS NOTE ADULT - PROBLEM SELECTOR PLAN 6
Chronic, hypotensive 88/51 on admission   - Hold home hypertensive medications for now due to borderline blood pressure   - Monitor routine hemodynamics
Chronic, hypotensive 88/51 on admission   - Hold home hypertensive medications for now due to borderline blood pressure   - Monitor routine hemodynamics

## 2023-08-22 NOTE — PROGRESS NOTE ADULT - PROBLEM SELECTOR PLAN 5
Hb 12.9  -Iron studies, Folate, B12  -Target Hb>7.0  -Trend hb.
Hb 12.9  -Iron studies, Folate, B12  -Target Hb>7.0  -Trend hb.

## 2023-08-22 NOTE — CARE COORDINATION ASSESSMENT. - NSCAREPROVIDERS_GEN_ALL_CORE_FT
CARE PROVIDERS:  Accepting Physician: Charlie Ortiz  Administration: Elenita Betancourt  Administration: Henri Sheppard  Admitting: Charlie Ortiz  Attending: Charlie Ortiz  Case Management: Janet Alexander  Case Management: Gume Quiles  Consultant: Asiha Berumen  Consultant: Fredy Ying  Consultant: Rosaura Mohr  Consultant: Esther Parr  Consultant: Sumeet Augustin  Consultant: Shady Cabello  Consultant: Bennie Irwin  ED Attending: Lele De Leon  ED Nurse: Raheem Cook  Nurse: Na Bustos  Nurse: Gertrude Cohen  Nurse: Nighat Olea  Nurse: Amanda Mckenzie  Ordered: Doctor, Unknown  Ordered: Physician, Ordering  Ordered: ADM, User  Outpatient Provider: Emilio Campbell  Outpatient Provider: Eddie Stacy  Override: Na Bustos  PCA/Nursing Assistant: Kaitlin Burgos  PCA/Nursing Assistant: Gianfranco Santiago  Primary Team: Caitlyn Campbell  Primary Team: Baudilio Mcintyre  Primary Team: Cherry Diallo  Primary Team: Bernard Daniel  Primary Team: Miguel Aguilar  Registered Dietitian: Eden Camacho  : Eleanor Ogden

## 2023-08-22 NOTE — PROGRESS NOTE ADULT - TIME BILLING
Note written by attending. Meds, labs, vitals, chart reviewed
Note written by attending. Meds, labs, vitals, chart reviewed

## 2023-08-22 NOTE — PROGRESS NOTE ADULT - NS ATTEND AMEND GEN_ALL_CORE FT
ER culture sent in wrong tube apparently not adequate for C&S.    Dressings changed today and wound was to be recultured by my PA  Remains indurated with little purulent drainage.  WBC stable.    Continue daily dressing changes.  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
Pt seen and examined.   WBC stable.  Afebrile  Dressings changed this morning.   Wound remains foul smelling but with little purulence.  Surrounding induration grossly unchanged but slightly less cellulitic appearing.  Packing removed and replaced.  Local wound care reviewed with patient's wife present at bedside as upon discharge she may need to assist with dressing care following BM as dressings will likely become soiled following BM.  Will need VNS vs SNF upon discharge for wound care.  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

## 2023-08-22 NOTE — PROGRESS NOTE ADULT - PROBLEM SELECTOR PLAN 4
Patient was noted to have abnormal LFTs with AST > 2x ALT likely 2/2 to alcohol use disorder   - f/u Hepatitis panel  -f/u US  -GI following   - [facility] protocol  - c/w folate, multivitamin, thiamine
Patient was noted to have abnormal LFTs with AST > 2x ALT likely 2/2 to alcohol use disorder   - f/u Hepatitis panel  - [facility] protocol  - c/w folate, multivitamin, thiamine

## 2023-08-22 NOTE — PROGRESS NOTE ADULT - PROBLEM SELECTOR PLAN 1
CT a/p: Perianal/ischioanal soft tissue are only partially imaged, no abscess seen.  - I&D performed in ED by Dr. Cabello   - Zosyn 3.375g IV x1 in ED   - Continue Zosyn 3.375G IV q8h   - BC NGTD  -abscess culture pending   - trend WBCs  - ID (Dr. Ying) consulted. Appreciate recommendations   - Surgery (Dr. Cabello) following. Local wound care per Surgery CT a/p: Perianal/ischioanal soft tissue are only partially imaged, no abscess seen.  - I&D performed in ED by Dr. Cabello   - Zosyn 3.375g IV x1 in ED   - Continue Zosyn 3.375G IV q8h   - BC NGTD  -abscess culture pending   - trend WBCs  - ID (Dr. Ying) consulted. Appreciate recommendations   - Surgery (Dr. Cbaello) following. Local wound care per Surgery  -Monitor for diarrhea

## 2023-08-22 NOTE — CARE COORDINATION ASSESSMENT. - OTHER PERTINENT DISCHARGE PLANNING INFORMATION:
SW spoke with this pt and spouse at bedside. SW discussed name role elos and dc planning. Pt lives with spouse in a private home, is independent prior to admission. PT is admitted s/p fall, with an abcess. As per surgery pt will need wound care upon DC- family open to [facility]. SW to follow for safe dc and emotional support.

## 2023-08-22 NOTE — PROGRESS NOTE ADULT - ASSESSMENT
69 y/o male BIBA s/p fall with history of perirectal abscess, s/p I&D 8/20.    PLAN:  - Care per primary team  - Daily packing changes - cleaned & changed this morning  - Continue antibiotics, wound cultures pending  - Pt follow up with Dr. Hooker (colorectal surgeon) outpatient - please include office information on discharge paperwork  - Discussed with Dr. Cabello    Surgical Team  Spectralink: [phone]   69 y/o male BIBA s/p fall with history of perirectal abscess, s/p I&D 8/20.    PLAN:  - Care per primary team  - Daily packing changes - cleaned & changed this morning; RN to replace packing/dressing as needed if it becomes soiled; will need VNS upon discharge  - Continue antibiotics, wound cultures pending  - Pt follow up with Dr. Hooker (colorectal surgeon) outpatient - please include office information on discharge paperwork  - Discussed with Dr. Cabello    Surgical Team  Spectralink: [phone]

## 2023-08-22 NOTE — PROGRESS NOTE ADULT - ASSESSMENT
68 y.o. M with PMHx of HTN, HLD, presents to the ED after a fall found to be hypotensive. Admitted for perianal abscess, s/p I& D by surgery

## 2023-08-22 NOTE — PROGRESS NOTE ADULT - PROBLEM SELECTOR PLAN 7
Chronic  - Continue home simvastatin 40mg therapeutically interchanged to atorvastatin 40mg
Chronic  - Continue home simvastatin 40mg therapeutically interchanged to atorvastatin 40mg

## 2023-08-22 NOTE — PROGRESS NOTE ADULT - PROBLEM SELECTOR PLAN 3
CT a/p: Incidental multiseptate left renal cystic lesion, 5 cm, indeterminate, possibly cystic neoplasm or complicated cyst.  - Hemo onc (Dr. Dee)  - Renal (Dr. Campbell) CT a/p: Incidental multiseptate left renal cystic lesion, 5 cm, indeterminate, possibly cystic neoplasm or complicated cyst.  - Hemo consult noted   -Outpatient f/u with Urologist

## 2023-08-22 NOTE — PATIENT CHOICE NOTE. - NSPTCHOICESTATE_GEN_ALL_CORE

## 2023-08-22 NOTE — PROGRESS NOTE ADULT - PROBLEM SELECTOR PLAN 2
Patient had 2 falls, possibly 2/2 to hypotension  - Fall precautions, bed alarm, chair alarm  - Check orthostatics  - PT bria
Patient had 2 falls, possibly 2/2 to hypotension  - Fall precautions, bed alarm, chair alarm  - Check orthostatics  - PT bria

## 2023-08-23 ENCOUNTER — TRANSCRIPTION ENCOUNTER (OUTPATIENT)
Age: 68
End: 2023-08-23

## 2023-08-23 VITALS
HEART RATE: 84 BPM | TEMPERATURE: 100 F | RESPIRATION RATE: 20 BRPM | DIASTOLIC BLOOD PRESSURE: 79 MMHG | OXYGEN SATURATION: 91 % | SYSTOLIC BLOOD PRESSURE: 122 MMHG

## 2023-08-23 LAB
ALBUMIN SERPL ELPH-MCNC: 3.1 G/DL — LOW (ref 3.3–5)
ALP SERPL-CCNC: 63 U/L — SIGNIFICANT CHANGE UP (ref 40–120)
ALT FLD-CCNC: 106 U/L — HIGH (ref 12–78)
ANION GAP SERPL CALC-SCNC: 7 MMOL/L — SIGNIFICANT CHANGE UP (ref 5–17)
AST SERPL-CCNC: 207 U/L — HIGH (ref 15–37)
BILIRUB SERPL-MCNC: 0.8 MG/DL — SIGNIFICANT CHANGE UP (ref 0.2–1.2)
BUN SERPL-MCNC: 9 MG/DL — SIGNIFICANT CHANGE UP (ref 7–23)
CALCIUM SERPL-MCNC: 9.7 MG/DL — SIGNIFICANT CHANGE UP (ref 8.5–10.1)
CHLORIDE SERPL-SCNC: 105 MMOL/L — SIGNIFICANT CHANGE UP (ref 96–108)
CO2 SERPL-SCNC: 28 MMOL/L — SIGNIFICANT CHANGE UP (ref 22–31)
CREAT SERPL-MCNC: 1.1 MG/DL — SIGNIFICANT CHANGE UP (ref 0.5–1.3)
CULTURE RESULTS: SIGNIFICANT CHANGE UP
EGFR: 72 ML/MIN/1.73M2 — SIGNIFICANT CHANGE UP
GLUCOSE SERPL-MCNC: 109 MG/DL — HIGH (ref 70–99)
HCT VFR BLD CALC: 40.9 % — SIGNIFICANT CHANGE UP (ref 39–50)
HGB BLD-MCNC: 14.1 G/DL — SIGNIFICANT CHANGE UP (ref 13–17)
MCHC RBC-ENTMCNC: 33.7 PG — SIGNIFICANT CHANGE UP (ref 27–34)
MCHC RBC-ENTMCNC: 34.5 GM/DL — SIGNIFICANT CHANGE UP (ref 32–36)
MCV RBC AUTO: 97.6 FL — SIGNIFICANT CHANGE UP (ref 80–100)
NRBC # BLD: 0 /100 WBCS — SIGNIFICANT CHANGE UP (ref 0–0)
PLATELET # BLD AUTO: 172 K/UL — SIGNIFICANT CHANGE UP (ref 150–400)
POTASSIUM SERPL-MCNC: 3.7 MMOL/L — SIGNIFICANT CHANGE UP (ref 3.5–5.3)
POTASSIUM SERPL-SCNC: 3.7 MMOL/L — SIGNIFICANT CHANGE UP (ref 3.5–5.3)
PROT SERPL-MCNC: 7.6 G/DL — SIGNIFICANT CHANGE UP (ref 6–8.3)
RBC # BLD: 4.19 M/UL — LOW (ref 4.2–5.8)
RBC # FLD: 12.6 % — SIGNIFICANT CHANGE UP (ref 10.3–14.5)
SODIUM SERPL-SCNC: 140 MMOL/L — SIGNIFICANT CHANGE UP (ref 135–145)
SPECIMEN SOURCE: SIGNIFICANT CHANGE UP
WBC # BLD: 5.81 K/UL — SIGNIFICANT CHANGE UP (ref 3.8–10.5)
WBC # FLD AUTO: 5.81 K/UL — SIGNIFICANT CHANGE UP (ref 3.8–10.5)

## 2023-08-23 PROCEDURE — 99285 EMERGENCY DEPT VISIT HI MDM: CPT | Mod: 25

## 2023-08-23 PROCEDURE — 82746 ASSAY OF FOLIC ACID SERUM: CPT

## 2023-08-23 PROCEDURE — 76705 ECHO EXAM OF ABDOMEN: CPT

## 2023-08-23 PROCEDURE — 83036 HEMOGLOBIN GLYCOSYLATED A1C: CPT

## 2023-08-23 PROCEDURE — 80074 ACUTE HEPATITIS PANEL: CPT

## 2023-08-23 PROCEDURE — 99232 SBSQ HOSP IP/OBS MODERATE 35: CPT

## 2023-08-23 PROCEDURE — 85025 COMPLETE CBC W/AUTO DIFF WBC: CPT

## 2023-08-23 PROCEDURE — 82607 VITAMIN B-12: CPT

## 2023-08-23 PROCEDURE — 85027 COMPLETE CBC AUTOMATED: CPT

## 2023-08-23 PROCEDURE — 82550 ASSAY OF CK (CPK): CPT

## 2023-08-23 PROCEDURE — 87040 BLOOD CULTURE FOR BACTERIA: CPT

## 2023-08-23 PROCEDURE — 93005 ELECTROCARDIOGRAM TRACING: CPT

## 2023-08-23 PROCEDURE — 84466 ASSAY OF TRANSFERRIN: CPT

## 2023-08-23 PROCEDURE — 87070 CULTURE OTHR SPECIMN AEROBIC: CPT

## 2023-08-23 PROCEDURE — 97162 PT EVAL MOD COMPLEX 30 MIN: CPT

## 2023-08-23 PROCEDURE — 96375 TX/PRO/DX INJ NEW DRUG ADDON: CPT

## 2023-08-23 PROCEDURE — 96376 TX/PRO/DX INJ SAME DRUG ADON: CPT

## 2023-08-23 PROCEDURE — 87086 URINE CULTURE/COLONY COUNT: CPT

## 2023-08-23 PROCEDURE — 85610 PROTHROMBIN TIME: CPT

## 2023-08-23 PROCEDURE — 82728 ASSAY OF FERRITIN: CPT

## 2023-08-23 PROCEDURE — 83540 ASSAY OF IRON: CPT

## 2023-08-23 PROCEDURE — 83605 ASSAY OF LACTIC ACID: CPT

## 2023-08-23 PROCEDURE — 80053 COMPREHEN METABOLIC PANEL: CPT

## 2023-08-23 PROCEDURE — 81001 URINALYSIS AUTO W/SCOPE: CPT

## 2023-08-23 PROCEDURE — 87507 IADNA-DNA/RNA PROBE TQ 12-25: CPT

## 2023-08-23 PROCEDURE — 96361 HYDRATE IV INFUSION ADD-ON: CPT

## 2023-08-23 PROCEDURE — 96365 THER/PROPH/DIAG IV INF INIT: CPT

## 2023-08-23 PROCEDURE — 36415 COLL VENOUS BLD VENIPUNCTURE: CPT

## 2023-08-23 PROCEDURE — 85730 THROMBOPLASTIN TIME PARTIAL: CPT

## 2023-08-23 PROCEDURE — 99239 HOSP IP/OBS DSCHRG MGMT >30: CPT

## 2023-08-23 PROCEDURE — 83550 IRON BINDING TEST: CPT

## 2023-08-23 PROCEDURE — 74177 CT ABD & PELVIS W/CONTRAST: CPT | Mod: MA

## 2023-08-23 RX ORDER — ACETAMINOPHEN 500 MG
2 TABLET ORAL
Qty: 0 | Refills: 0 | DISCHARGE
Start: 2023-08-23

## 2023-08-23 RX ORDER — NICOTINE POLACRILEX 2 MG
1 GUM BUCCAL
Qty: 30 | Refills: 0
Start: 2023-08-23 | End: 2023-09-21

## 2023-08-23 RX ORDER — FOLIC ACID 0.8 MG
1 TABLET ORAL
Qty: 0 | Refills: 0 | DISCHARGE
Start: 2023-08-23

## 2023-08-23 RX ORDER — THIAMINE MONONITRATE (VIT B1) 100 MG
1 TABLET ORAL
Qty: 0 | Refills: 0 | DISCHARGE
Start: 2023-08-23

## 2023-08-23 RX ORDER — CARVEDILOL PHOSPHATE 80 MG/1
1 CAPSULE, EXTENDED RELEASE ORAL
Qty: 60 | Refills: 0
Start: 2023-08-23

## 2023-08-23 RX ORDER — LOPERAMIDE HCL 2 MG
2 TABLET ORAL ONCE
Refills: 0 | Status: DISCONTINUED | OUTPATIENT
Start: 2023-08-23 | End: 2023-08-23

## 2023-08-23 RX ORDER — HYDROGEN PEROXIDE 0.3 KG/100L
1 LIQUID TOPICAL
Qty: 0 | Refills: 0 | DISCHARGE
Start: 2023-08-23

## 2023-08-23 RX ORDER — ASPIRIN/CALCIUM CARB/MAGNESIUM 324 MG
1 TABLET ORAL
Qty: 30 | Refills: 0
Start: 2023-08-23

## 2023-08-23 RX ORDER — LACTOBACILLUS ACIDOPHILUS 100MM CELL
1 CAPSULE ORAL
Qty: 21 | Refills: 0
Start: 2023-08-23 | End: 2023-08-29

## 2023-08-23 RX ADMIN — Medication 100 MILLIGRAM(S): at 11:42

## 2023-08-23 RX ADMIN — HYDROGEN PEROXIDE 1 APPLICATION(S): 0.3 LIQUID TOPICAL at 11:44

## 2023-08-23 RX ADMIN — Medication 1 PATCH: at 11:41

## 2023-08-23 RX ADMIN — ENOXAPARIN SODIUM 40 MILLIGRAM(S): 100 INJECTION SUBCUTANEOUS at 11:41

## 2023-08-23 RX ADMIN — Medication 1 TABLET(S): at 11:42

## 2023-08-23 RX ADMIN — Medication 1 MILLIGRAM(S): at 11:42

## 2023-08-23 RX ADMIN — PIPERACILLIN AND TAZOBACTAM 25 GRAM(S): 4; .5 INJECTION, POWDER, LYOPHILIZED, FOR SOLUTION INTRAVENOUS at 05:02

## 2023-08-23 NOTE — DISCHARGE NOTE PROVIDER - CARE PROVIDER_API CALL
Steffanie Hooker.  Colon/Rectal Surgery  [address], [place] [address]  Phone: [phone]  Fax: [phone]  Follow Up Time: 2 weeks    Emilio Campbell  Nephrology  [address], [address]  [place] [address]  Phone: [phone]  Fax: [phone]  Follow Up Time: 1 week    Eddie Stacy  Gastroenterology  [address] [place] [address]  Phone: [phone]  Fax: [phone]  Follow Up Time: 1 week   Steffanie Hooker  Colon/Rectal Surgery  [address], [place] [address]  Phone: [phone]  Fax: [phone]  Follow Up Time: 2 weeks    Eddie Stacy  Gastroenterology  [address] [place] [address]  Phone: [phone]  Fax: [phone]  Follow Up Time: 1 week    Moisés Hernandez  [place] [address] [facility] [facility] [address]  [place] [address]  Phone: [phone]  Fax: [phone]  Established Patient  Follow Up Time: 1-3 days

## 2023-08-23 NOTE — DISCHARGE NOTE PROVIDER - NSDCFUADDAPPT_GEN_ALL_CORE_FT
please follow up with your primary care physician in 1-2 weeks for repeat blood pressure check. Hold your home amlodipine 5mg - valsartan 320mg daily until seen by your primary care physician  please follow up with your primary care physician in 1-2 weeks for repeat blood pressure check. Continue to hold your home amlodipine 5mg - valsartan 320mg and carvedilol 25mg twice a day until seen by your primary care physician Dr Hernandez

## 2023-08-23 NOTE — DISCHARGE NOTE NURSING/CASE MANAGEMENT/SOCIAL WORK - NSDCFUADDAPPT_GEN_ALL_CORE_FT
please follow up with your primary care physician in 1-2 weeks for repeat blood pressure check. Continue to hold your home amlodipine 5mg - valsartan 320mg and carvedilol 25mg twice a day until seen by your primary care physician Dr Hernandez

## 2023-08-23 NOTE — DISCHARGE NOTE PROVIDER - PROVIDER TOKENS
PROVIDER:[TOKEN:[[id number]],FOLLOWUP:[2 weeks]],PROVIDER:[TOKEN:[[id number]],FOLLOWUP:[1 week]],PROVIDER:[TOKEN:[[id number]],FOLLOWUP:[1 week]] PROVIDER:[TOKEN:[[id number]],FOLLOWUP:[2 weeks]],PROVIDER:[TOKEN:[[id number]],FOLLOWUP:[1 week]],PROVIDER:[TOKEN:[[id number]],FOLLOWUP:[1-3 days],ESTABLISHEDPATIENT:[T]]

## 2023-08-23 NOTE — PROGRESS NOTE ADULT - ASSESSMENT
A:   69 y/o male BIBA s/p fall with history of perirectal abscess, s/p I&D 8/20.    PLAN:   - Care per primary team  - Daily packing changes - cleaned & changed this morning; RN to replace packing/dressing as needed if it becomes soiled; will need VNS upon discharge  - Continue Zosyn, pending culture sensitivities.  - Pt follow up with Dr. Hooker (colorectal surgeon) outpatient - please include office information on discharge paperwork  - Discussed with Dr. Cabello    Surgical Team  Spectralink: [phone]      Surgical Team  Spectralink: [phone]   A:   67 y/o male BIBA s/p fall with history of perirectal abscess, s/p I&D 8/20.    PLAN:   - Care per primary team  - To contact SW regarding VNS - Daily packing changes - cleaned & changed this morning; RN to replace packing/dressing as needed if it becomes soiled;  - Continue Zosyn, pending culture sensitivities.  - Pt follow up with Dr. Hooker (colorectal surgeon) outpatient - please include office information on discharge paperwork  - Discussed with Dr. Cabello    Surgical Team  Spectralink: [phone]      Surgical Team  Spectralink: [phone]

## 2023-08-23 NOTE — CAREGIVER ENGAGEMENT NOTE - CAREGIVER OUTREACH NOTES - FREE TEXT
Met with pateint and spouse at bedside.  They are aware [facility] accepted for home services pending d/c summary being completed.  They are aware primary RN will d/c with supplies and agency contact information is on d/c summary.  Primary RN aware.

## 2023-08-23 NOTE — PROGRESS NOTE ADULT - SUBJECTIVE AND OBJECTIVE BOX
[facility]  INFECTIOUS DISEASES   [address] [place] [address]  Tel: [phone]     Fax: [phone]  ========================================================  MD Michael Chester Kaushal, MD Cho, Michelle, MD Sunjit, Jaspal, MD  ========================================================    [id number]  ASAF MEEKS     Follow up: Perianal abscess    Doing better, pain is improving, no fever.     PAST MEDICAL & SURGICAL HISTORY:  HLD (hyperlipidemia)  HTN (hypertension)    Social Hx: +smoking used to smoke 1/2 pack for years, lately smokes cigar, + EtOH nightly  2-3 vodka drink, no drugs     FAMILY HISTORY:  FH: chronic kidney disease (Father)    FH: HTN (hypertension) (Father)    Allergies  No Known Allergies    Antibiotics:  MEDICATIONS  (STANDING):  atorvastatin 40 milliGRAM(s) Oral at bedtime  folic acid 1 milliGRAM(s) Oral daily  hydrogen peroxide 3% Solution 1 Application(s) Topical daily  multivitamin 1 Tablet(s) Oral daily  nicotine -  14 mG/24Hr(s) Patch 1 Patch Transdermal daily  piperacillin/tazobactam IVPB.- 3.375 Gram(s) IV Intermittent once  piperacillin/tazobactam IVPB.. 3.375 Gram(s) IV Intermittent every 8 hours  sodium chloride 0.9%. 1000 milliLiter(s) (75 mL/Hr) IV Continuous <Continuous>  thiamine 100 milliGRAM(s) Oral daily    MEDICATIONS  (PRN):  acetaminophen     Tablet .. 650 milliGRAM(s) Oral every 6 hours PRN Temp greater or equal to 38C (100.4F), Mild Pain (1 - 3)  aluminum hydroxide/magnesium hydroxide/simethicone Suspension 30 milliLiter(s) Oral every 4 hours PRN Dyspepsia  LORazepam     Tablet 1 milliGRAM(s) Oral every 2 hours PRN CIWA-Ar score increase by 2 points and a total score of 7 or less  LORazepam     Tablet 1 milliGRAM(s) Oral every 1 hour PRN CIWA-Ar score 8 or greater  melatonin 3 milliGRAM(s) Oral at bedtime PRN Insomnia     REVIEW OF SYSTEMS:  CONSTITUTIONAL:  No Fever or chills  HEENT:  No diplopia or blurred vision.  No sore throat or runny nose.  CARDIOVASCULAR:  No chest pain or SOB.  RESPIRATORY:  No cough, shortness of breath, PND or orthopnea.  GASTROINTESTINAL:  No nausea, vomiting or diarrhea. mild pain in anal area  GENITOURINARY:  No dysuria, frequency or urgency. No Blood in urine  MUSCULOSKELETAL:  no joint aches, no muscle pain  SKIN:  No change in skin, hair or nails.  NEUROLOGIC:  No paresthesias or weakness.  PSYCHIATRIC:  No disorder of thought or mood.  ENDOCRINE:  No heat or cold intolerance, polyuria or polydipsia.  HEMATOLOGICAL:  No easy bruising or bleeding.     Physical Exam:  Vital Signs Last 24 Hrs  T(C): 37.7 (23 Aug 2023 11:55), Max: 37.7 (23 Aug 2023 11:55)  T(F): 99.9 (23 Aug 2023 11:55), Max: 99.9 (23 Aug 2023 11:55)  HR: 84 (23 Aug 2023 11:55) (71 - 84)  BP: 122/79 (23 Aug 2023 11:55) (122/79 - 140/87)  BP(mean): --  RR: 20 (23 Aug 2023 11:55) (18 - 20)  SpO2: 91% (23 Aug 2023 11:55) (91% - 95%)  Parameters below as of 23 Aug 2023 11:55  Patient On (Oxygen Delivery Method): room air  GEN: NAD, obese   HEENT: normocephalic and atraumatic. EOMI. PERRL.    NECK: Supple.  No lymphadenopathy   LUNGS: Clear to auscultation.  HEART: Regular rate and rhythm without murmur.  ABDOMEN: Soft, nontender, and nondistended.  Positive bowel sounds.    : No CVA tenderness, perianal skin with induration and open with gauze packed  minimal bleeding  EXTREMITIES: +edema.  NEUROLOGIC: grossly intact.  PSYCHIATRIC: Appropriate affect .  SKIN: No rash       Labs:                        14.1   5.81  )-----------( 172      ( 23 Aug 2023 08:55 )             40.9     08-23    140  |  105  |  9   ----------------------------<  109<H>  3.7   |  28  |  1.10    Ca    9.7      23 Aug 2023 08:55    TPro  7.6  /  Alb  3.1<L>  /  TBili  0.8  /  DBili  x   /  AST  207<H>  /  ALT  106<H>  /  AlkPhos  63  08-23    Culture - Other (collected 08-21-23 @ 15:40)  Source: .Other perianal abscess    Culture - Urine (collected 08-20-23 @ 09:09)  Source: Clean Catch Clean Catch (Midstream)  Final Report (08-21-23 @ 11:34):    No growth    Culture - Blood (collected 08-20-23 @ 06:10)  Source: .Blood Blood    Culture - Blood (collected 08-20-23 @ 06:10)  Source: .Blood Blood    WBC Count: 5.81 K/uL (08-23-23 @ 08:55)  WBC Count: 6.39 K/uL (08-22-23 @ 08:10)  WBC Count: 7.91 K/uL (08-21-23 @ 06:50)  WBC Count: 9.10 K/uL (08-20-23 @ 06:10)    Creatinine: 1.10 mg/dL (08-23-23 @ 08:55)  Creatinine: 1.10 mg/dL (08-22-23 @ 08:10)  Creatinine: 1.20 mg/dL (08-21-23 @ 06:50)  Creatinine: 1.60 mg/dL (08-20-23 @ 06:10)    Ferritin: 522 ng/mL (08-21-23 @ 06:50)      All imaging and other data have been reviewed.  CT a/p 8/20: Perianal/ischioanal soft tissue are only partially imaged, no abscess seen.  Incidental multiseptate left renal cystic lesion, 5 cm, indeterminate, possibly cystic neoplasm or complicated cyst. Recommend outpatient follow-up with urology and dedicated contrast-enhanced renal mass protocol CT or MRI for further characterization. Comparison with any relevant imaging from an outside facility could be helpful.    Assessment and Plan:   67 yo man with PMH of HTN was admitted after a fall. He fell in bathroom and couldn't get up but not feeling dizzy, loss of consciousness, or any head trauma. His wife at the bedside.   He started that for one week he felt a small anal abscess. No fever or chills. Seen in ED by surgery and had a bedside I&D.     Infection seems contained, less likely sepsis, less likely hypotension is related to infection but will rule out bacteremia.     # Perianal abscess  # fall   - Blood cultures NGTD, UC neg  - Wound culture with normal enteric heriberto   - GI PCR negative   - Tmax and WBC both normal   - Can switch from zosyn to augmentin to complete about 10days total   - Surgery follow up noted, need colorectal follow up after discharge   - Fall work up as per primary team   - Counseling for smoking and drinking     Will sign off please call with any question.     Fredy Ying MD  Division of Infectious Diseases   Please call ID service at [phone] with any question.    35 minutes spent on total encounter assessing patient, examination, chart review, counseling and coordinating care by the attending physician/nurse/care manager.  
Patient is a 68y old  Male who presents with a chief complaint of Perianal abscess (21 Aug 2023 13:20)    Patient seen in follow up for MYRA.        PAST MEDICAL HISTORY:  HLD (hyperlipidemia)    HTN (hypertension)      MEDICATIONS  (STANDING):  atorvastatin 40 milliGRAM(s) Oral at bedtime  enoxaparin Injectable 40 milliGRAM(s) SubCutaneous <User Schedule>  folic acid 1 milliGRAM(s) Oral daily  hydrogen peroxide 3% Solution 1 Application(s) Topical daily  lactobacillus acidophilus 1 Tablet(s) Oral three times a day with meals  loperamide 2 milliGRAM(s) Oral once  multivitamin 1 Tablet(s) Oral daily  nicotine -  14 mG/24Hr(s) Patch 1 Patch Transdermal daily  piperacillin/tazobactam IVPB.. 3.375 Gram(s) IV Intermittent every 8 hours  thiamine 100 milliGRAM(s) Oral daily    MEDICATIONS  (PRN):  acetaminophen     Tablet .. 650 milliGRAM(s) Oral every 6 hours PRN Temp greater or equal to 38C (100.4F), Mild Pain (1 - 3)  aluminum hydroxide/magnesium hydroxide/simethicone Suspension 30 milliLiter(s) Oral every 4 hours PRN Dyspepsia  LORazepam     Tablet 1 milliGRAM(s) Oral every 2 hours PRN CIWA-Ar score increase by 2 points and a total score of 7 or less  LORazepam     Tablet 1 milliGRAM(s) Oral every 1 hour PRN CIWA-Ar score 8 or greater  melatonin 3 milliGRAM(s) Oral at bedtime PRN Insomnia    T(C): 37.7 (08-23-23 @ 11:55), Max: 37.7 (08-23-23 @ 11:55)  HR: 84 (08-23-23 @ 11:55) (70 - 84)  BP: 122/79 (08-23-23 @ 11:55) (107/62 - 140/87)  RR: 20 (08-23-23 @ 11:55)  SpO2: 91% (08-23-23 @ 11:55)  Wt(kg): --  I&O's Detail      PHYSICAL EXAM:  General: No distress  Respiratory: b/l air entry  Cardiovascular: S1 S2  Gastrointestinal: soft  Extremities:  no edema                          LABORATORY:                        14.1   5.81  )-----------( 172      ( 23 Aug 2023 08:55 )             40.9     08-23    140  |  105  |  9   ----------------------------<  109<H>  3.7   |  28  |  1.10    Ca    9.7      23 Aug 2023 08:55    TPro  7.6  /  Alb  3.1<L>  /  TBili  0.8  /  DBili  x   /  AST  207<H>  /  ALT  106<H>  /  AlkPhos  63  08-23    Sodium: 140 mmol/L (08-23 @ 08:55)  Sodium: 140 mmol/L (08-22 @ 08:10)    Potassium: 3.7 mmol/L (08-23 @ 08:55)  Potassium: 3.6 mmol/L (08-22 @ 08:10)    Hemoglobin: 14.1 g/dL (08-23 @ 08:55)  Hemoglobin: 12.5 g/dL (08-22 @ 08:10)  Hemoglobin: 12.4 g/dL (08-21 @ 06:50)    Creatinine, Serum 1.10 (08-23 @ 08:55)  Creatinine, Serum 1.10 (08-22 @ 08:10)  Creatinine, Serum 1.20 (08-21 @ 06:50)        LIVER FUNCTIONS - ( 23 Aug 2023 08:55 )  Alb: 3.1 g/dL / Pro: 7.6 g/dL / ALK PHOS: 63 U/L / ALT: 106 U/L / AST: 207 U/L / GGT: x           Urinalysis Basic - ( 23 Aug 2023 08:55 )    Color: x / Appearance: x / SG: x / pH: x  Gluc: 109 mg/dL / Ketone: x  / Bili: x / Urobili: x   Blood: x / Protein: x / Nitrite: x   Leuk Esterase: x / RBC: x / WBC x   Sq Epi: x / Non Sq Epi: x / Bacteria: x      
[facility]  Sumeet Berumen PA-C  [address] [place] [address]  [phone]      INTERVAL HPI/OVERNIGHT EVENTS:  Pt s/e with wife at bedside  One episode of diarrhea this am  GI PCR neg    MEDICATIONS  (STANDING):  atorvastatin 40 milliGRAM(s) Oral at bedtime  enoxaparin Injectable 40 milliGRAM(s) SubCutaneous <User Schedule>  folic acid 1 milliGRAM(s) Oral daily  hydrogen peroxide 3% Solution 1 Application(s) Topical daily  lactobacillus acidophilus 1 Tablet(s) Oral three times a day with meals  loperamide 2 milliGRAM(s) Oral once  multivitamin 1 Tablet(s) Oral daily  nicotine -  14 mG/24Hr(s) Patch 1 Patch Transdermal daily  piperacillin/tazobactam IVPB.. 3.375 Gram(s) IV Intermittent every 8 hours  thiamine 100 milliGRAM(s) Oral daily    MEDICATIONS  (PRN):  acetaminophen     Tablet .. 650 milliGRAM(s) Oral every 6 hours PRN Temp greater or equal to 38C (100.4F), Mild Pain (1 - 3)  aluminum hydroxide/magnesium hydroxide/simethicone Suspension 30 milliLiter(s) Oral every 4 hours PRN Dyspepsia  LORazepam     Tablet 1 milliGRAM(s) Oral every 2 hours PRN CIWA-Ar score increase by 2 points and a total score of 7 or less  LORazepam     Tablet 1 milliGRAM(s) Oral every 1 hour PRN CIWA-Ar score 8 or greater  melatonin 3 milliGRAM(s) Oral at bedtime PRN Insomnia      Allergies    No Known Allergies      PHYSICAL EXAM:   Vital Signs:  Vital Signs Last 24 Hrs  T(C): 37.7 (23 Aug 2023 11:55), Max: 37.7 (23 Aug 2023 11:55)  T(F): 99.9 (23 Aug 2023 11:55), Max: 99.9 (23 Aug 2023 11:55)  HR: 84 (23 Aug 2023 11:55) (70 - 84)  BP: 122/79 (23 Aug 2023 11:55) (122/79 - 140/87)  BP(mean): --  RR: 20 (23 Aug 2023 11:55) (18 - 20)  SpO2: 91% (23 Aug 2023 11:55) (91% - 95%)    Parameters below as of 23 Aug 2023 11:55  Patient On (Oxygen Delivery Method): room air      GENERAL:  Appears stated age  HEENT:  NC/AT  CHEST:  Full & symmetric excursion  HEART:  Regular rhythm  ABDOMEN:  Soft, non-tender, non-distended  EXTEREMITIES:  no cyanosis  SKIN:  No rash  NEURO:  Alert      LABS:                        14.1   5.81  )-----------( 172      ( 23 Aug 2023 08:55 )             40.9     08-23    140  |  105  |  9   ----------------------------<  109<H>  3.7   |  28  |  1.10    Ca    9.7      23 Aug 2023 08:55    TPro  7.6  /  Alb  3.1<L>  /  TBili  0.8  /  DBili  x   /  AST  207<H>  /  ALT  106<H>  /  AlkPhos  63  08-23      Urinalysis Basic - ( 23 Aug 2023 08:55 )    Color: x / Appearance: x / SG: x / pH: x  Gluc: 109 mg/dL / Ketone: x  / Bili: x / Urobili: x   Blood: x / Protein: x / Nitrite: x   Leuk Esterase: x / RBC: x / WBC x   Sq Epi: x / Non Sq Epi: x / Bacteria: x  
Pt seen and examined at bedside, reports two episodes of diarrhea overnight - dressing was removed at that time. Overnight RN offered to place it but pt declined at that time.  No other complaints, denies any nausea, vomiting, chest pain, palpitations, shortness of breath.    T(C): 36.8 (08-22-23 @ 05:13), Max: 36.8 (08-22-23 @ 05:13)  HR: 80 (08-22-23 @ 05:13) (72 - 80)  BP: 121/80 (08-22-23 @ 05:13) (107/62 - 121/80)  RR: 18 (08-22-23 @ 05:13) (18 - 19)  SpO2: 91% (08-22-23 @ 05:13) (91% - 94%)  Wt(kg): --    PHYSICAL EXAM:  General: no acute distress, appears comfortable  HEENT: normocephalic, anicteric  Pulm: non labored respirations  Cardio: RRR  Abdomen: soft, nontender, nondistended  Perianal Region: small incision right side, packed w/ iodoform - packing w/ purulent drainage  Extremities: no calf tenderness noted    I&O's Detail    LABS:                        12.4   7.91  )-----------( 145      ( 21 Aug 2023 06:50 )             36.7     08-21    142  |  107  |  19  ----------------------------<  89  3.6   |  26  |  1.20    Ca    8.6      21 Aug 2023 06:50    TPro  6.6  /  Alb  2.8<L>  /  TBili  0.9  /  DBili  x   /  AST  291<H>  /  ALT  90<H>  /  AlkPhos  55  08-21    PT/INR - ( 21 Aug 2023 06:50 )   PT: 13.0 sec;   INR: 1.11 ratio      PTT - ( 21 Aug 2023 06:50 )  PTT:31.8 sec  Urinalysis Basic - ( 21 Aug 2023 06:50 )    Color: x / Appearance: x / SG: x / pH: x  Gluc: 89 mg/dL / Ketone: x  / Bili: x / Urobili: x   Blood: x / Protein: x / Nitrite: x   Leuk Esterase: x / RBC: x / WBC x   Sq Epi: x / Non Sq Epi: x / Bacteria: x    Culture - Urine (collected 20 Aug 2023 09:09)  Source: Clean Catch Clean Catch (Midstream)  Final Report (21 Aug 2023 11:34):    No growth    Culture - Blood (collected 20 Aug 2023 06:10)  Source: .Blood Blood  Preliminary Report (21 Aug 2023 12:01):    No growth at 24 hours    Culture - Blood (collected 20 Aug 2023 06:10)  Source: .Blood Blood  Preliminary Report (21 Aug 2023 12:01):    No growth at 24 hours    MEDICATIONS:  acetaminophen     Tablet .. 650 milliGRAM(s) Oral every 6 hours PRN  aluminum hydroxide/magnesium hydroxide/simethicone Suspension 30 milliLiter(s) Oral every 4 hours PRN  atorvastatin 40 milliGRAM(s) Oral at bedtime  folic acid 1 milliGRAM(s) Oral daily  hydrogen peroxide 3% Solution 1 Application(s) Topical daily  LORazepam     Tablet 1 milliGRAM(s) Oral every 2 hours PRN  LORazepam     Tablet 1 milliGRAM(s) Oral every 1 hour PRN  melatonin 3 milliGRAM(s) Oral at bedtime PRN  multivitamin 1 Tablet(s) Oral daily  nicotine -  14 mG/24Hr(s) Patch 1 Patch Transdermal daily  piperacillin/tazobactam IVPB.. 3.375 Gram(s) IV Intermittent every 8 hours  thiamine 100 milliGRAM(s) Oral daily  
Pt seen and examined at bedside, vital signs stable. Denies pain this morning. Denies any nausea, vomiting, chest pain, palpitations, shortness of breath.    T(C): 36.8 (23 @ 05:25), Max: 36.8 (23 @ 16:56)  HR: 76 (23 @ 05:25) (70 - 78)  BP: 100/62 (23 @ 05:25) (95/58 - 115/71)  RR: 20 (23 @ 05:25) (16 - 20)  SpO2: 95% (23 @ 05:25) (91% - 96%)    PHYSICAL EXAM:  General: no acute distress, appears comfortable  HEENT: normocephalic, anicteric  Pulm: non labored respirations  Cardio: RRR  Abdomen: soft, nontender, nondistended  Extremities: no calf tenderness noted    I&O's Detail      LABS:                        12.8   9.10  )-----------( 142      ( 20 Aug 2023 06:10 )             37.8     08-20    139  |  106  |  25<H>  ----------------------------<  123<H>  3.5   |  25  |  1.60<H>    Ca    8.7      20 Aug 2023 06:10    TPro  7.1  /  Alb  3.0<L>  /  TBili  0.9  /  DBili  x   /  AST  227<H>  /  ALT  69  /  AlkPhos  55  08-20    PT/INR - ( 20 Aug 2023 06:10 )   PT: 15.1 sec;   INR: 1.30 ratio     PTT - ( 20 Aug 2023 06:10 )  PTT:32.3 sec  Urinalysis Basic - ( 20 Aug 2023 09:09 )    Color: Dark Yellow / Appearance: Cloudy / S.037 / pH: x  Gluc: x / Ketone: 15 mg/dL  / Bili: Negative / Urobili: 1.0 mg/dL   Blood: x / Protein: 100 mg/dL / Nitrite: Negative   Leuk Esterase: Trace / RBC: 2 /HPF / WBC 15 /HPF   Sq Epi: x / Non Sq Epi: x / Bacteria: x    MEDICATIONS:  acetaminophen     Tablet .. 650 milliGRAM(s) Oral every 6 hours PRN  aluminum hydroxide/magnesium hydroxide/simethicone Suspension 30 milliLiter(s) Oral every 4 hours PRN  atorvastatin 40 milliGRAM(s) Oral at bedtime  folic acid 1 milliGRAM(s) Oral daily  hydrogen peroxide 3% Solution 1 Application(s) Topical daily  LORazepam     Tablet 1 milliGRAM(s) Oral every 2 hours PRN  LORazepam     Tablet 1 milliGRAM(s) Oral every 1 hour PRN  melatonin 3 milliGRAM(s) Oral at bedtime PRN  multivitamin 1 Tablet(s) Oral daily  nicotine -  14 mG/24Hr(s) Patch 1 Patch Transdermal daily  piperacillin/tazobactam IVPB.- 3.375 Gram(s) IV Intermittent once  piperacillin/tazobactam IVPB.. 3.375 Gram(s) IV Intermittent every 8 hours  sodium chloride 0.9%. 1000 milliLiter(s) IV Continuous <Continuous>  thiamine 100 milliGRAM(s) Oral daily  
INTERVAL HPI/OVERNIGHT EVENTS:  Pt. seen and examined at bedside, reports resting well, no acute overnight events. Pt states minimal discomfort s/p drainage and packing. Pt states had 3-4 nonbloody episodes of diarrhea yesterday without discomfort. no other complaints, denies nausea, vomiting, chest pain, palpitations, SOB.      Vital Signs Last 24 Hrs  T(C): 36.8 (23 Aug 2023 04:52), Max: 37 (22 Aug 2023 12:49)  T(F): 98.2 (23 Aug 2023 04:52), Max: 98.6 (22 Aug 2023 12:49)  HR: 71 (23 Aug 2023 04:52) (70 - 74)  BP: 140/87 (23 Aug 2023 04:52) (129/82 - 140/87)  BP(mean): --  RR: 18 (23 Aug 2023 04:52) (18 - 18)  SpO2: 95% (23 Aug 2023 04:52) (92% - 95%)    Parameters below as of 23 Aug 2023 04:52  Patient On (Oxygen Delivery Method): room air        PHYSICAL EXAM:    General: no acute distress, appears comfortable  HEENT: normocephalic, anicteric  Pulm: non labored respirations  Cardio: RRR  Abdomen: soft, nontender, nondistended  Perianal Region: small incision right side, packed w/ iodoform - packing w/ purulent drainage  Extremities: no calf tenderness noted    I&O's Detail      MEDICATIONS  (STANDING):  atorvastatin 40 milliGRAM(s) Oral at bedtime  enoxaparin Injectable 40 milliGRAM(s) SubCutaneous <User Schedule>  folic acid 1 milliGRAM(s) Oral daily  hydrogen peroxide 3% Solution 1 Application(s) Topical daily  lactobacillus acidophilus 1 Tablet(s) Oral three times a day with meals  multivitamin 1 Tablet(s) Oral daily  nicotine -  14 mG/24Hr(s) Patch 1 Patch Transdermal daily  piperacillin/tazobactam IVPB.. 3.375 Gram(s) IV Intermittent every 8 hours  thiamine 100 milliGRAM(s) Oral daily    MEDICATIONS  (PRN):  acetaminophen     Tablet .. 650 milliGRAM(s) Oral every 6 hours PRN Temp greater or equal to 38C (100.4F), Mild Pain (1 - 3)  aluminum hydroxide/magnesium hydroxide/simethicone Suspension 30 milliLiter(s) Oral every 4 hours PRN Dyspepsia  LORazepam     Tablet 1 milliGRAM(s) Oral every 2 hours PRN CIWA-Ar score increase by 2 points and a total score of 7 or less  LORazepam     Tablet 1 milliGRAM(s) Oral every 1 hour PRN CIWA-Ar score 8 or greater  melatonin 3 milliGRAM(s) Oral at bedtime PRN Insomnia      LABS:                        12.5   6.39  )-----------( 148      ( 22 Aug 2023 08:10 )             37.6     08-22    140  |  110<H>  |  15  ----------------------------<  100<H>  3.6   |  25  |  1.10    Ca    8.8      22 Aug 2023 08:10    TPro  6.8  /  Alb  2.7<L>  /  TBili  0.6  /  DBili  x   /  AST  237<H>  /  ALT  94<H>  /  AlkPhos  51  08-22      Urinalysis Basic - ( 22 Aug 2023 08:10 )    Color: x / Appearance: x / SG: x / pH: x  Gluc: 100 mg/dL / Ketone: x  / Bili: x / Urobili: x   Blood: x / Protein: x / Nitrite: x   Leuk Esterase: x / RBC: x / WBC x   Sq Epi: x / Non Sq Epi: x / Bacteria: x      Culture Results:   Normal enteric heriberto isolated (08-21 @ 15:40)  Culture Results:   No growth (08-20 @ 09:09)    type    RADIOLOGY & ADDITIONAL STUDIES:    Impression: 68y Male    Plan:  
[facility]  INFECTIOUS DISEASES   [address] [place] [address]  Tel: [phone]     Fax: [phone]  ========================================================  MD Michael Chester Kaushal, MD Cho, Michelle, MD Sunjit, Jaspal, MD  ========================================================    [id number]  ASAF MEEKS     Follow up: Perianal abscess    Doing better, pain is improving, no fever.     PAST MEDICAL & SURGICAL HISTORY:  HLD (hyperlipidemia)  HTN (hypertension)    Social Hx: +smoking used to smoke 1/2 pack for years, lately smokes cigar, + EtOH nightly  2-3 vodka drink, no drugs     FAMILY HISTORY:  FH: chronic kidney disease (Father)    FH: HTN (hypertension) (Father)    Allergies  No Known Allergies    Antibiotics:  MEDICATIONS  (STANDING):  atorvastatin 40 milliGRAM(s) Oral at bedtime  folic acid 1 milliGRAM(s) Oral daily  hydrogen peroxide 3% Solution 1 Application(s) Topical daily  multivitamin 1 Tablet(s) Oral daily  nicotine -  14 mG/24Hr(s) Patch 1 Patch Transdermal daily  piperacillin/tazobactam IVPB.- 3.375 Gram(s) IV Intermittent once  piperacillin/tazobactam IVPB.. 3.375 Gram(s) IV Intermittent every 8 hours  sodium chloride 0.9%. 1000 milliLiter(s) (75 mL/Hr) IV Continuous <Continuous>  thiamine 100 milliGRAM(s) Oral daily    MEDICATIONS  (PRN):  acetaminophen     Tablet .. 650 milliGRAM(s) Oral every 6 hours PRN Temp greater or equal to 38C (100.4F), Mild Pain (1 - 3)  aluminum hydroxide/magnesium hydroxide/simethicone Suspension 30 milliLiter(s) Oral every 4 hours PRN Dyspepsia  LORazepam     Tablet 1 milliGRAM(s) Oral every 2 hours PRN CIWA-Ar score increase by 2 points and a total score of 7 or less  LORazepam     Tablet 1 milliGRAM(s) Oral every 1 hour PRN CIWA-Ar score 8 or greater  melatonin 3 milliGRAM(s) Oral at bedtime PRN Insomnia     REVIEW OF SYSTEMS:  CONSTITUTIONAL:  No Fever or chills  HEENT:  No diplopia or blurred vision.  No sore throat or runny nose.  CARDIOVASCULAR:  No chest pain or SOB.  RESPIRATORY:  No cough, shortness of breath, PND or orthopnea.  GASTROINTESTINAL:  No nausea, vomiting or diarrhea. mild pain in anal area  GENITOURINARY:  No dysuria, frequency or urgency. No Blood in urine  MUSCULOSKELETAL:  no joint aches, no muscle pain  SKIN:  No change in skin, hair or nails.  NEUROLOGIC:  No paresthesias or weakness.  PSYCHIATRIC:  No disorder of thought or mood.  ENDOCRINE:  No heat or cold intolerance, polyuria or polydipsia.  HEMATOLOGICAL:  No easy bruising or bleeding.     Physical Exam:  Vital Signs Last 24 Hrs  T(C): 37 (22 Aug 2023 12:49), Max: 37 (22 Aug 2023 12:49)  T(F): 98.6 (22 Aug 2023 12:49), Max: 98.6 (22 Aug 2023 12:49)  HR: 70 (22 Aug 2023 12:49) (70 - 80)  BP: 129/82 (22 Aug 2023 12:49) (109/64 - 129/82)  BP(mean): --  RR: 18 (22 Aug 2023 12:49) (18 - 18)  SpO2: 92% (22 Aug 2023 12:49) (91% - 94%)  Parameters below as of 22 Aug 2023 12:49  Patient On (Oxygen Delivery Method): room air  GEN: NAD, obese   HEENT: normocephalic and atraumatic. EOMI. PERRL.    NECK: Supple.  No lymphadenopathy   LUNGS: Clear to auscultation.  HEART: Regular rate and rhythm without murmur.  ABDOMEN: Soft, nontender, and nondistended.  Positive bowel sounds.    : No CVA tenderness, perianal skin with induration and open with gauze packed  minimal bleeding  EXTREMITIES: +edema.  NEUROLOGIC: grossly intact.  PSYCHIATRIC: Appropriate affect .  SKIN: No rash       Labs:                        12.5   6.39  )-----------( 148      ( 22 Aug 2023 08:10 )             37.6     08-22    140  |  110<H>  |  15  ----------------------------<  100<H>  3.6   |  25  |  1.10    Ca    8.8      22 Aug 2023 08:10    TPro  6.8  /  Alb  2.7<L>  /  TBili  0.6  /  DBili  x   /  AST  237<H>  /  ALT  94<H>  /  AlkPhos  51  08-22      Culture - Other (collected 08-21-23 @ 15:40)  Source: .Other perianal abscess    Culture - Urine (collected 08-20-23 @ 09:09)  Source: Clean Catch Clean Catch (Midstream)  Final Report (08-21-23 @ 11:34):    No growth    Culture - Blood (collected 08-20-23 @ 06:10)  Source: .Blood Blood    Culture - Blood (collected 08-20-23 @ 06:10)  Source: .Blood Blood    WBC Count: 6.39 K/uL (08-22-23 @ 08:10)  WBC Count: 7.91 K/uL (08-21-23 @ 06:50)  WBC Count: 9.10 K/uL (08-20-23 @ 06:10)    Creatinine: 1.10 mg/dL (08-22-23 @ 08:10)  Creatinine: 1.20 mg/dL (08-21-23 @ 06:50)  Creatinine: 1.60 mg/dL (08-20-23 @ 06:10)    Ferritin: 522 ng/mL (08-21-23 @ 06:50)      All imaging and other data have been reviewed.  CT a/p 8/20: Perianal/ischioanal soft tissue are only partially imaged, no abscess seen.  Incidental multiseptate left renal cystic lesion, 5 cm, indeterminate, possibly cystic neoplasm or complicated cyst. Recommend outpatient follow-up with urology and dedicated contrast-enhanced renal mass protocol CT or MRI for further characterization. Comparison with any relevant imaging from an outside facility could be helpful.    Assessment and Plan:   69 yo man with PMH of HTN was admitted after a fall. He fell in bathroom and couldn't get up but not feeling dizzy, loss of consciousness, or any head trauma. His wife at the bedside.   He started that for one week he felt a small anal abscess. No fever or chills. Seen in ED by surgery and had a bedside I&D.     Infection seems contained, less likely sepsis, less likely hypotension is related to infection but will rule out bacteremia.     # Perianal abscess  # fall   - Bood cultures NGTD, UC neg  - Wound culture with normal enteric heriberto   - Tmax and WBC both normal   - Continue zosyn to cover GI heriberto grown in culture   - Can switch to augmentin to complete about 10days total  - Surgery follow up noted, need colorectal follow up after discharge   - Fall work up as per primary team   - Counseling for smoking and drinking     Will follow.    Fredy Ying MD  Division of Infectious Diseases   Please call ID service at [phone] with any question.    35 minutes spent on total encounter assessing patient, examination, chart review, counseling and coordinating care by the attending physician/nurse/care manager.  
[facility]  Sumeet Berumen PA-C  [address] [place] [address]  [phone]      INTERVAL HPI/OVERNIGHT EVENTS:  Pt s/e with wife at bedside  Diarrhea starting last night  No abdominal pain, N/V or further GI complaints    MEDICATIONS  (STANDING):  atorvastatin 40 milliGRAM(s) Oral at bedtime  enoxaparin Injectable 40 milliGRAM(s) SubCutaneous <User Schedule>  folic acid 1 milliGRAM(s) Oral daily  hydrogen peroxide 3% Solution 1 Application(s) Topical daily  lactobacillus acidophilus 1 Tablet(s) Oral three times a day with meals  multivitamin 1 Tablet(s) Oral daily  nicotine -  14 mG/24Hr(s) Patch 1 Patch Transdermal daily  piperacillin/tazobactam IVPB.. 3.375 Gram(s) IV Intermittent every 8 hours  thiamine 100 milliGRAM(s) Oral daily    MEDICATIONS  (PRN):  acetaminophen     Tablet .. 650 milliGRAM(s) Oral every 6 hours PRN Temp greater or equal to 38C (100.4F), Mild Pain (1 - 3)  aluminum hydroxide/magnesium hydroxide/simethicone Suspension 30 milliLiter(s) Oral every 4 hours PRN Dyspepsia  LORazepam     Tablet 1 milliGRAM(s) Oral every 2 hours PRN CIWA-Ar score increase by 2 points and a total score of 7 or less  LORazepam     Tablet 1 milliGRAM(s) Oral every 1 hour PRN CIWA-Ar score 8 or greater  melatonin 3 milliGRAM(s) Oral at bedtime PRN Insomnia      Allergies    No Known Allergies      PHYSICAL EXAM:   Vital Signs:  Vital Signs Last 24 Hrs  T(C): 36.8 (22 Aug 2023 05:13), Max: 36.8 (22 Aug 2023 05:13)  T(F): 98.2 (22 Aug 2023 05:13), Max: 98.2 (22 Aug 2023 05:13)  HR: 80 (22 Aug 2023 05:13) (72 - 80)  BP: 121/80 (22 Aug 2023 05:13) (107/62 - 121/80)  BP(mean): --  RR: 18 (22 Aug 2023 05:13) (18 - 19)  SpO2: 91% (22 Aug 2023 05:13) (91% - 94%)    Parameters below as of 22 Aug 2023 05:13  Patient On (Oxygen Delivery Method): room air      GENERAL:  Appears stated age  HEENT:  NC/AT  CHEST:  Full & symmetric excursion  HEART:  Regular rhythm  ABDOMEN:  Soft, non-tender, non-distended  EXTEREMITIES:  no cyanosis  SKIN:  No rash  NEURO:  Alert      LABS:                        12.5   6.39  )-----------( 148      ( 22 Aug 2023 08:10 )             37.6     08-22    140  |  110<H>  |  15  ----------------------------<  100<H>  3.6   |  25  |  1.10    Ca    8.8      22 Aug 2023 08:10    TPro  6.8  /  Alb  2.7<L>  /  TBili  0.6  /  DBili  x   /  AST  237<H>  /  ALT  94<H>  /  AlkPhos  51  08-22    PT/INR - ( 21 Aug 2023 06:50 )   PT: 13.0 sec;   INR: 1.11 ratio         PTT - ( 21 Aug 2023 06:50 )  PTT:31.8 sec  Urinalysis Basic - ( 22 Aug 2023 08:10 )    Color: x / Appearance: x / SG: x / pH: x  Gluc: 100 mg/dL / Ketone: x  / Bili: x / Urobili: x   Blood: x / Protein: x / Nitrite: x   Leuk Esterase: x / RBC: x / WBC x   Sq Epi: x / Non Sq Epi: x / Bacteria: x  
Patient is a 68y old  Male who presents with a chief complaint of Perianal abscess (21 Aug 2023 13:20)    Patient seen in follow up for MYRA.        PAST MEDICAL HISTORY:  HLD (hyperlipidemia)    HTN (hypertension)      MEDICATIONS  (STANDING):  atorvastatin 40 milliGRAM(s) Oral at bedtime  folic acid 1 milliGRAM(s) Oral daily  hydrogen peroxide 3% Solution 1 Application(s) Topical daily  multivitamin 1 Tablet(s) Oral daily  nicotine -  14 mG/24Hr(s) Patch 1 Patch Transdermal daily  piperacillin/tazobactam IVPB.. 3.375 Gram(s) IV Intermittent every 8 hours  thiamine 100 milliGRAM(s) Oral daily    MEDICATIONS  (PRN):  acetaminophen     Tablet .. 650 milliGRAM(s) Oral every 6 hours PRN Temp greater or equal to 38C (100.4F), Mild Pain (1 - 3)  aluminum hydroxide/magnesium hydroxide/simethicone Suspension 30 milliLiter(s) Oral every 4 hours PRN Dyspepsia  LORazepam     Tablet 1 milliGRAM(s) Oral every 2 hours PRN CIWA-Ar score increase by 2 points and a total score of 7 or less  LORazepam     Tablet 1 milliGRAM(s) Oral every 1 hour PRN CIWA-Ar score 8 or greater  melatonin 3 milliGRAM(s) Oral at bedtime PRN Insomnia    T(C): 36.6 (08-21-23 @ 12:58), Max: 37.1 (08-20-23 @ 05:31)  HR: 72 (08-21-23 @ 12:58) (70 - 78)  BP: 107/62 (08-21-23 @ 12:58) (88/51 - 115/71)  RR: 19 (08-21-23 @ 12:58) (16 - 20)  SpO2: 93% (08-21-23 @ 12:58) (91% - 97%)  Wt(kg): --  I&O's Detail      PHYSICAL EXAM:  General: No distress  Respiratory: b/l air entry  Cardiovascular: S1 S2  Gastrointestinal: soft  Extremities:  no edema                              12.4   7.91  )-----------( 145      ( 21 Aug 2023 06:50 )             36.7     08-21    142  |  107  |  19  ----------------------------<  89  3.6   |  26  |  1.20    Ca    8.6      21 Aug 2023 06:50    TPro  6.6  /  Alb  2.8<L>  /  TBili  0.9  /  DBili  x   /  AST  291<H>  /  ALT  90<H>  /  AlkPhos  55  08-21    CARDIAC MARKERS ( 21 Aug 2023 06:50 )  x     / x     / 6391 U/L / x     / x          LIVER FUNCTIONS - ( 21 Aug 2023 06:50 )  Alb: 2.8 g/dL / Pro: 6.6 g/dL / ALK PHOS: 55 U/L / ALT: 90 U/L / AST: 291 U/L / GGT: x           Urinalysis Basic - ( 21 Aug 2023 06:50 )    Color: x / Appearance: x / SG: x / pH: x  Gluc: 89 mg/dL / Ketone: x  / Bili: x / Urobili: x   Blood: x / Protein: x / Nitrite: x   Leuk Esterase: x / RBC: x / WBC x   Sq Epi: x / Non Sq Epi: x / Bacteria: x        Sodium, Serum: 142 (08-21 @ 06:50)  Sodium, Serum: 139 (08-20 @ 06:10)    Creatinine, Serum: 1.20 (08-21 @ 06:50)  Creatinine, Serum: 1.60 (08-20 @ 06:10)    Potassium, Serum: 3.6 (08-21 @ 06:50)  Potassium, Serum: 3.5 (08-20 @ 06:10)    Hemoglobin: 12.4 (08-21 @ 06:50)  Hemoglobin: 12.8 (08-20 @ 06:10)    
Patient is a 68y old  Male who presents with a chief complaint of Perianal abscess (21 Aug 2023 08:43)      INTERVAL HPI/OVERNIGHT EVENTS: Patient seen and examined at bedside. Denies pain, chest pain, palpitation, sob. No diarrhea, abdomina pain, nausea or vomiting     MEDICATIONS  (STANDING):  atorvastatin 40 milliGRAM(s) Oral at bedtime  folic acid 1 milliGRAM(s) Oral daily  hydrogen peroxide 3% Solution 1 Application(s) Topical daily  multivitamin 1 Tablet(s) Oral daily  nicotine -  14 mG/24Hr(s) Patch 1 Patch Transdermal daily  piperacillin/tazobactam IVPB.- 3.375 Gram(s) IV Intermittent once  piperacillin/tazobactam IVPB.. 3.375 Gram(s) IV Intermittent every 8 hours  thiamine 100 milliGRAM(s) Oral daily    MEDICATIONS  (PRN):  acetaminophen     Tablet .. 650 milliGRAM(s) Oral every 6 hours PRN Temp greater or equal to 38C (100.4F), Mild Pain (1 - 3)  aluminum hydroxide/magnesium hydroxide/simethicone Suspension 30 milliLiter(s) Oral every 4 hours PRN Dyspepsia  LORazepam     Tablet 1 milliGRAM(s) Oral every 2 hours PRN CIWA-Ar score increase by 2 points and a total score of 7 or less  LORazepam     Tablet 1 milliGRAM(s) Oral every 1 hour PRN CIWA-Ar score 8 or greater  melatonin 3 milliGRAM(s) Oral at bedtime PRN Insomnia      Allergies    No Known Allergies    Intolerances        REVIEW OF SYSTEMS:  Per HPI. All other ROS is noted Negative   Vital Signs Last 24 Hrs  T(C): 36.8 (21 Aug 2023 05:25), Max: 36.8 (20 Aug 2023 16:56)  T(F): 98.2 (21 Aug 2023 05:25), Max: 98.2 (20 Aug 2023 16:56)  HR: 76 (21 Aug 2023 05:25) (70 - 78)  BP: 100/62 (21 Aug 2023 05:25) (95/58 - 115/71)  BP(mean): --  RR: 20 (21 Aug 2023 05:25) (16 - 20)  SpO2: 95% (21 Aug 2023 05:25) (91% - 96%)    Parameters below as of 21 Aug 2023 05:25  Patient On (Oxygen Delivery Method): room air        PHYSICAL EXAM:  GENERAL: NAD, well-groomed, well-developed  HEAD:  Atraumatic, Normocephalic  EYES: EOMI, PERRLA, conjunctiva and sclera clear  ENMT: No tonsillar erythema, exudates, or enlargement; Moist mucous membranes, Good dentition, No lesions  NECK: Supple, No JVD, Normal thyroid  NERVOUS SYSTEM:  Alert & Oriented X3, Good concentration; Motor Strength 5/5 B/L upper and lower extremities  CHEST/LUNG: Clear to auscultation bilaterally; No rales, rhonchi, wheezing, or rubs  HEART: Regular rate and rhythm; No murmurs, rubs, or gallops  ABDOMEN: Soft, Nontender, Nondistended; Bowel sounds present  EXTREMITIES:  2+ Peripheral Pulses, No clubbing, cyanosis, or edema  LYMPH: No lymphadenopathy noted  SKIN: No rashes or lesions    LABS:                        12.4   7.91  )-----------( x        ( 21 Aug 2023 06:50 )             36.7     21 Aug 2023 06:50    142    |  107    |  19     ----------------------------<  89     3.6     |  26     |  1.20     Ca    8.6        21 Aug 2023 06:50    TPro  x      /  Alb  2.8    /  TBili  x      /  DBili  x      /  AST  291    /  ALT  90     /  AlkPhos  x      21 Aug 2023 06:50    PT/INR - ( 21 Aug 2023 06:50 )   PT: 13.0 sec;   INR: 1.11 ratio         PTT - ( 21 Aug 2023 06:50 )  PTT:31.8 sec  CAPILLARY BLOOD GLUCOSE        BLOOD CULTURE    RADIOLOGY & ADDITIONAL TESTS:    Imaging Personally Reviewed:  [ ] YES     Consultant(s) Notes Reviewed:      Care Discussed with Consultants/Other Providers:
Patient is a 68y old  Male who presents with a chief complaint of Perianal abscess (22 Aug 2023 07:54)       INTERVAL HPI/OVERNIGHT EVENTS: Patient seen and examined at bedside. No new symptoms, complaints noted. Denies chest pain, palpitation sob. Had loose BM this am but no diarrhea     MEDICATIONS  (STANDING):  atorvastatin 40 milliGRAM(s) Oral at bedtime  folic acid 1 milliGRAM(s) Oral daily  hydrogen peroxide 3% Solution 1 Application(s) Topical daily  multivitamin 1 Tablet(s) Oral daily  nicotine -  14 mG/24Hr(s) Patch 1 Patch Transdermal daily  piperacillin/tazobactam IVPB.. 3.375 Gram(s) IV Intermittent every 8 hours  thiamine 100 milliGRAM(s) Oral daily    MEDICATIONS  (PRN):  acetaminophen     Tablet .. 650 milliGRAM(s) Oral every 6 hours PRN Temp greater or equal to 38C (100.4F), Mild Pain (1 - 3)  aluminum hydroxide/magnesium hydroxide/simethicone Suspension 30 milliLiter(s) Oral every 4 hours PRN Dyspepsia  LORazepam     Tablet 1 milliGRAM(s) Oral every 2 hours PRN CIWA-Ar score increase by 2 points and a total score of 7 or less  LORazepam     Tablet 1 milliGRAM(s) Oral every 1 hour PRN CIWA-Ar score 8 or greater  melatonin 3 milliGRAM(s) Oral at bedtime PRN Insomnia      Allergies    No Known Allergies    Intolerances        REVIEW OF SYSTEMS:  Per HPI. All other ROS is noted Negative   Vital Signs Last 24 Hrs  T(C): 36.8 (22 Aug 2023 05:13), Max: 36.8 (22 Aug 2023 05:13)  T(F): 98.2 (22 Aug 2023 05:13), Max: 98.2 (22 Aug 2023 05:13)  HR: 80 (22 Aug 2023 05:13) (72 - 80)  BP: 121/80 (22 Aug 2023 05:13) (107/62 - 121/80)  BP(mean): --  RR: 18 (22 Aug 2023 05:13) (18 - 19)  SpO2: 91% (22 Aug 2023 05:13) (91% - 94%)    Parameters below as of 22 Aug 2023 05:13  Patient On (Oxygen Delivery Method): room air    PHYSICAL EXAM:  GENERAL: NAD, well-groomed, well-developed  HEAD:  Atraumatic, Normocephalic  EYES: EOMI, PERRLA, conjunctiva and sclera clear  ENMT: No tonsillar erythema, exudates, or enlargement; Moist mucous membranes, Good dentition, No lesions  NECK: Supple, No JVD, Normal thyroid  NERVOUS SYSTEM:  Alert & Oriented X3, Good concentration; Motor Strength 5/5 B/L upper and lower extremities  CHEST/LUNG: Clear to auscultation bilaterally; No rales, rhonchi, wheezing, or rubs  HEART: Regular rate and rhythm; No murmurs, rubs, or gallops  ABDOMEN: Soft, Nontender, Nondistended; Bowel sounds present  EXTREMITIES:  2+ Peripheral Pulses, No clubbing, cyanosis, or edema  LYMPH: No lymphadenopathy noted  SKIN: No rashes or lesions  LABS:                        12.5   6.39  )-----------( 148      ( 22 Aug 2023 08:10 )             37.6     22 Aug 2023 08:10    140    |  110    |  15     ----------------------------<  100    3.6     |  25     |  1.10     Ca    8.8        22 Aug 2023 08:10    TPro  6.8    /  Alb  2.7    /  TBili  0.6    /  DBili  x      /  AST  237    /  ALT  94     /  AlkPhos  51     22 Aug 2023 08:10    PT/INR - ( 21 Aug 2023 06:50 )   PT: 13.0 sec;   INR: 1.11 ratio         PTT - ( 21 Aug 2023 06:50 )  PTT:31.8 sec  CAPILLARY BLOOD GLUCOSE        BLOOD CULTURE  08-20 @ 09:09   No growth  --  --  08-20 @ 06:10   No growth at 24 hours  --  --    RADIOLOGY & ADDITIONAL TESTS:    Imaging Personally Reviewed:  [ ] YES     Consultant(s) Notes Reviewed:      Care Discussed with Consultants/Other Providers:

## 2023-08-23 NOTE — SBIRT NOTE ADULT - NSSBIRTDRGUSEDOTHTHAN_GEN_A_CORE
Congratulations on your weight loss!    Exercise 30-45 minutes 5 days a week.    Take vitamin D3 2000 international units per day  
No

## 2023-08-23 NOTE — DISCHARGE NOTE PROVIDER - NSDCCPCAREPLAN_GEN_ALL_CORE_FT
PRINCIPAL DISCHARGE DIAGNOSIS  Diagnosis: Abscess, perianal  Assessment and Plan of Treatment: You had an incision and drainage by surgery.   You were seen by surgery and infectious disease.   You were on zosyn and was changed to Augmentin for 7 more days.  You will need daily packing changes and will be discharge with home care.  Continue probiotic and imodium over the counter if needed for diarrhea.      SECONDARY DISCHARGE DIAGNOSES  Diagnosis: HTN (hypertension)  Assessment and Plan of Treatment: Your home amlodipine 5mg - valsartan 320mg daily was held.   Restart your carvedilol 25mg twice a day   Follow up with your primary care physician in 1-2 weeks.    Diagnosis: Transaminitis  Assessment and Plan of Treatment: Your liver function test was elevated.   You were seen by gastroenterology  Sonogram did not reveal any pathology in the gallbladder/liver.   Your liver functions were improving.  Your hepatitis panel was negative.   Outpatient follow up with your primary care physician.     PRINCIPAL DISCHARGE DIAGNOSIS  Diagnosis: Abscess, perianal  Assessment and Plan of Treatment: You had an incision and drainage by surgery.   You were seen by surgery and infectious disease.   You were on zosyn and was changed to Augmentin for 7 more days.  You will need daily packing changes and will be discharge with home care.  Continue probiotic and imodium over the counter if needed for diarrhea.      SECONDARY DISCHARGE DIAGNOSES  Diagnosis: HTN (hypertension)  Assessment and Plan of Treatment: Continue to hold your  home amlodipine 5mg - valsartan 320mg daily and carvedilol 25mg twice a day   Follow up with your primary care physician in 1 week for repeat BP check    Diagnosis: Transaminitis  Assessment and Plan of Treatment: Your liver function test was elevated.   You were seen by gastroenterology  Sonogram did not reveal any pathology in the gallbladder/liver.   Your liver functions were improving.  Your hepatitis panel was negative.   Outpatient follow up with your primary care physician.

## 2023-08-23 NOTE — PROGRESS NOTE ADULT - ASSESSMENT
etoh abuse  inc lfts  rectal abscess    Avoid all non-essential hepatotoxic drugs	  US noted d/w family  Hepatitis panel neg  Diet and weight control discussed    Monitor daily liver function test  GI PCR neg  ok to start imodium  d/w pt and wife  d/c planning    I reviewed the overnight course of events on the unit, re-confirming the patient history. I discussed the care with the patient and their family  Differential diagnosis and plan of care discussed with patient after the evaluation  40 minutes spent on total encounter of which more than fifty percent of the encounter was spent counseling and/or coordinating care by the attending physician.  Advanced care planning was discussed with patient and family.  Advanced care planning forms were reviewed and discussed.  Risks, benefits and alternatives of gastroenterologic procedures were discussed in detail and all questions were answered.

## 2023-08-23 NOTE — DISCHARGE NOTE PROVIDER - HOSPITAL COURSE
69 y/o M PMH HTN, HLD who presents to the ED for weakness and fall. Noted a perianal abscess that was present for one week. He was seen by surgery and taken for an I+D on 8/20. He was getting daily packing changes and will be discharge with VNS. Patient was on zosyn and changed to Augmentin for 7 more days for total of 10 days. He did have diarrhea. GI PCR was negative. started on imodium prn.     Patient had elevated LFTs. He was seen by GI. transaminitis were stable. hepatitis panel was negative. Abdominal sonogram was unremarkable. likely secondary to ETOH use.     His blood pressure was low on arrival. He had prerenal MYRA/hemodynamic ATN. Cr improved on discharge. His home BP medications were held. reviewed with [facility]. Patient is on norvasc 5 - valsartan 320mg daily and coreg 25mg twice a day. Patient to continue coreg on discharge. hold norvasc 5/valsartan 320mg until seen by PMD.      He was on empiric CIWA with ativan prn during hospitalization. He did not require any prn ativan and his CIWA score remained 0 on day of discharge.     Consults:  Surgery: Dr Cabello   Nephro: Dr Campbell   GI: Dr Stacy   ID: Dr Ying show 67 y/o M PMH HTN, HLD who presents to the ED for weakness and fall. Noted a perianal abscess that was present for one week. He was seen by surgery and taken for an I+D on 8/20. He was getting daily packing changes and will be discharge with VNS. Patient was on zosyn and changed to Augmentin for 7 more days for total of 10 days. He did have diarrhea. GI PCR was negative. started on imodium prn.     Patient had elevated LFTs. He was seen by GI. transaminitis were stable. hepatitis panel was negative. Abdominal sonogram was unremarkable. likely secondary to ETOH use.     His blood pressure was low on arrival. He had prerenal MYRA/hemodynamic ATN. Cr improved on discharge. His home BP medications were held. reviewed with [facility]. Patient is on norvasc 5 - valsartan 320mg daily and coreg 25mg twice a day. Patient to continue coreg on discharge. hold norvasc 5/valsartan 320mg until seen by PMD.      He was on empiric CIWA with ativan prn during hospitalization. He did not require any prn ativan and his CIWA score remained 0 on day of discharge.     Consults:  Surgery: Dr Cabello   Nephro: Dr Campbell   GI: Dr Stacy   ID: Dr Ying     updated PMD on plan for discharge.

## 2023-08-23 NOTE — PROGRESS NOTE ADULT - PROVIDER SPECIALTY LIST ADULT
Infectious Disease
Nephrology
Infectious Disease
Surgery
Gastroenterology
Surgery
Gastroenterology
Nephrology
Surgery
Internal Medicine
Internal Medicine

## 2023-08-23 NOTE — DISCHARGE NOTE PROVIDER - NSDCMRMEDTOKEN_GEN_ALL_CORE_FT
acetaminophen 325 mg oral tablet: 2 tab(s) orally every 6 hours As needed Temp greater or equal to 38C (100.4F), Mild Pain (1 - 3)  amoxicillin-clavulanate 875 mg-125 mg oral tablet: 1 tab(s) orally 2 times a day Need 7 more days  folic acid 1 mg oral tablet: 1 tab(s) orally once a day  hydrogen peroxide 3% topical solution: 1 Apply topically to affected area once a day  lactobacillus acidophilus oral capsule: 1 cap(s) orally 3 times a day  Multiple Vitamins oral tablet: 1 tab(s) orally once a day  nicotine 14 mg/24 hr transdermal film, extended release: 1 patch transdermal once a day  simvastatin 40 mg oral tablet: 1 tab(s) orally once a day  thiamine 100 mg oral tablet: 1 tab(s) orally once a day   acetaminophen 325 mg oral tablet: 2 tab(s) orally every 6 hours As needed Temp greater or equal to 38C (100.4F), Mild Pain (1 - 3)  amoxicillin-clavulanate 875 mg-125 mg oral tablet: 1 tab(s) orally 2 times a day Need 7 more days  Coreg 25 mg oral tablet: 1 tab(s) orally 2 times a day  folic acid 1 mg oral tablet: 1 tab(s) orally once a day  hydrogen peroxide 3% topical solution: 1 Apply topically to affected area once a day  lactobacillus acidophilus oral capsule: 1 cap(s) orally 3 times a day  Multiple Vitamins oral tablet: 1 tab(s) orally once a day  nicotine 14 mg/24 hr transdermal film, extended release: 1 patch transdermal once a day  simvastatin 40 mg oral tablet: 1 tab(s) orally once a day  thiamine 100 mg oral tablet: 1 tab(s) orally once a day

## 2023-08-23 NOTE — PROGRESS NOTE ADULT - REASON FOR ADMISSION
Perianal abscess

## 2023-08-23 NOTE — DISCHARGE NOTE NURSING/CASE MANAGEMENT/SOCIAL WORK - PATIENT PORTAL LINK FT
You can access the FollowMyHealth Patient Portal offered by [facility] by registering at the following website: [email or web address]. By joining [employer]’s FollowMyHealth portal, you will also be able to view your health information using other applications (apps) compatible with our system.

## 2023-08-23 NOTE — PROGRESS NOTE ADULT - ASSESSMENT
MYRA: Prerenal azotemia, Hemodynamic ATN  Hypotension  Perirectal abscess    Improved and stable renal indices. BP better. Hold anti hypertensive meds for now and resume as outpt.   F/u with PCP. D/c planning.  MYRA: Prerenal azotemia, Hemodynamic ATN  Hypotension  Perirectal abscess    Improved and stable renal indices. BP better. Resume coreg and hold other anti hypertensive meds including ARB for now and resume as outpt.   F/u with PCP. D/c planning.

## 2023-08-23 NOTE — DISCHARGE NOTE PROVIDER - ATTENDING DISCHARGE PHYSICAL EXAMINATION:
GENERAL: NAD, well-groomed, well-developed  HEAD:  Atraumatic, Normocephalic  ENMT: Moist mucous membranes  NERVOUS SYSTEM:  Alert & Oriented X3, Good concentration; Motor Strength 5/5 B/L upper and lower extremities  CHEST/LUNG: Clear to auscultation bilaterally; No rales, rhonchi, wheezing, or rubs  HEART: Regular rate and rhythm; No murmurs, rubs, or gallops  ABDOMEN: Soft, Nontender, Nondistended; Bowel sounds present  EXTREMITIES:  2+ Peripheral Pulses, No clubbing, cyanosis, or edema

## 2023-08-25 PROBLEM — I10 ESSENTIAL (PRIMARY) HYPERTENSION: Chronic | Status: ACTIVE | Noted: 2023-08-20

## 2023-08-25 PROBLEM — E78.5 HYPERLIPIDEMIA, UNSPECIFIED: Chronic | Status: ACTIVE | Noted: 2023-08-20

## 2023-08-25 LAB
CULTURE RESULTS: SIGNIFICANT CHANGE UP
CULTURE RESULTS: SIGNIFICANT CHANGE UP
SPECIMEN SOURCE: SIGNIFICANT CHANGE UP
SPECIMEN SOURCE: SIGNIFICANT CHANGE UP

## 2023-09-18 ENCOUNTER — NON-APPOINTMENT (OUTPATIENT)
Age: 68
End: 2023-09-18

## 2023-09-18 ENCOUNTER — APPOINTMENT (OUTPATIENT)
Dept: COLORECTAL SURGERY | Facility: CLINIC | Age: 68
End: 2023-09-18
Payer: MEDICARE

## 2023-09-18 VITALS
TEMPERATURE: 97.3 F | RESPIRATION RATE: 15 BRPM | HEIGHT: 71 IN | BODY MASS INDEX: 30.52 KG/M2 | OXYGEN SATURATION: 95 % | DIASTOLIC BLOOD PRESSURE: 82 MMHG | WEIGHT: 218 LBS | SYSTOLIC BLOOD PRESSURE: 124 MMHG | HEART RATE: 82 BPM

## 2023-09-18 DIAGNOSIS — K61.0 ANAL ABSCESS: ICD-10-CM

## 2023-09-18 PROCEDURE — 17250 CHEM CAUT OF GRANLTJ TISSUE: CPT

## 2023-11-14 NOTE — CONSULT NOTE ADULT - SUBJECTIVE AND OBJECTIVE BOX
All records reviewed.    HPI:  67 yo man w hx HTN HLD, brought to hospital as had fallen in bathroom and could not get back up due to weakness, found by wife early AM.  Also reports 1wk hx perianal abscess which he thinks popped the day prior to adm  Denies fever, chills, cough, blood in urine/stool, abdomen or flank/back pain, anorexai weight loss    ED course:   Bedside I&D performed by Dr. Cabello   Vitals: BP: 95/58, HR: 70bpm, Temp: 98F, RR: 16, SpO2: 95% on RA   Labs significant for: H/H 12.8/37.8, Plt 142, PT 15.1, INR 1.30, BUN 25, Cr 1.60, Gluc 123, , eGFR 46   UA: Cloudy, + Ketone, + Protein, Trace Leukocyte esterase, Large blood, + WBC, present granular casts, present squamous epithelial cells   EKG: NSR, 77bpm   In ED given: Zosyn 3.375g IV x1, NS 1L bolus x1     Imaging  CT a/p: Perianal/ischioanal soft tissue are only partially imaged, no abscess seen.  Incidental multiseptate left renal cystic lesion, 5 cm, indeterminate, possibly cystic neoplasm or complicated cyst. Recommend outpatient follow-up with urology and dedicated contrast-enhanced renal mass protocol CT or MRI for further characterization. Comparison with any relevant imaging from an outside facility could be helpful.   (20 Aug 2023 12:28)      PAST MEDICAL & SURGICAL HISTORY:  HLD (hyperlipidemia)      HTN (hypertension)          Review of System:  see above    MEDICATIONS  (STANDING):  atorvastatin 40 milliGRAM(s) Oral at bedtime  folic acid 1 milliGRAM(s) Oral daily  multivitamin 1 Tablet(s) Oral daily  nicotine -  14 mG/24Hr(s) Patch 1 Patch Transdermal daily  piperacillin/tazobactam IVPB.- 3.375 Gram(s) IV Intermittent once  thiamine 100 milliGRAM(s) Oral daily    MEDICATIONS  (PRN):  acetaminophen     Tablet .. 650 milliGRAM(s) Oral every 6 hours PRN Temp greater or equal to 38C (100.4F), Mild Pain (1 - 3)  aluminum hydroxide/magnesium hydroxide/simethicone Suspension 30 milliLiter(s) Oral every 4 hours PRN Dyspepsia  LORazepam     Tablet 1 milliGRAM(s) Oral every 2 hours PRN CIWA-Ar score increase by 2 points and a total score of 7 or less  LORazepam     Tablet 1 milliGRAM(s) Oral every 1 hour PRN CIWA-Ar score 8 or greater  melatonin 3 milliGRAM(s) Oral at bedtime PRN Insomnia      Allergies    No Known Allergies    Intolerances        SOCIAL HISTORY:  smoker since age 12, stopped age 38 and restarted 15yrs later, 1/2ppd  Etoh 1/2pint betty [place]  PMD Dr Moisés Hernandez    FAMILY HISTORY:  FH: chronic kidney disease (Father)    FH: HTN (hypertension) (Father)        Vital Signs Last 24 Hrs  T(C): 36.8 (20 Aug 2023 16:56), Max: 37.1 (20 Aug 2023 05:31)  T(F): 98.2 (20 Aug 2023 16:56), Max: 98.7 (20 Aug 2023 05:31)  HR: 78 (20 Aug 2023 16:56) (70 - 78)  BP: 115/71 (20 Aug 2023 16:56) (88/51 - 115/71)  BP(mean): --  RR: 20 (20 Aug 2023 16:56) (16 - 20)  SpO2: 93% (20 Aug 2023 16:56) (93% - 97%)    Parameters below as of 20 Aug 2023 16:56  Patient On (Oxygen Delivery Method): room air        PHYSICAL EXAM:      General:supine in bed,in no acute distress  Eyes:sclera anicteric, pupils equal and EOMI  ENMT:buccal mucosa moist,nose midline  Neck:supple, trachea midline  Lungs:clear, no wheeze/rhonchi  Cardiovascular:regular rate and rhythm, S1 S2  Abdomen:soft, nontender, no palp masses present, bowel sounds normal  Neurological:alert and oriented x3, Cranial Nerves II-XII grossly intact  Skin:no increased ecchymosis/petechiae/purpura  Lymph Nodes:no palpable cervical/supraclavicular lymph nodes enlargements  Extremities:no cyanosis/clubbing/edema        LABS:                        12.8   9.10  )-----------( 142      ( 20 Aug 2023 06:10 )             37.8     08-20 @ 06:10  WBC9.10  RBC3.81 Hgb12.8 Hct37.8  MCV99.2  Dscm413  Auto Jvfyak23.4 Band-- Auto Lymph9.1 Atypical Lymph-- Reactive Lymph-- Auto Mono5.8 Auto Eos0.0 Auto Baso0.2              139  |  106  |  25<H>  ----------------------------<  123<H>  3.5   |  25  |  1.60<H>    Ca    8.7      20 Aug 2023 06:10    TPro  7.1  /  Alb  3.0<L>  /  TBili  0.9  /  DBili  x   /  AST  227<H>  /  ALT  69  /  AlkPhos  55   @ 06:10  PT15.1 INR1.30  PTT32.3    Urinalysis Basic - ( 20 Aug 2023 09:09 )    Color: Dark Yellow / Appearance: Cloudy / S.037 / pH: x  Gluc: x / Ketone: 15 mg/dL  / Bili: Negative / Urobili: 1.0 mg/dL   Blood: x / Protein: 100 mg/dL / Nitrite: Negative   Leuk Esterase: Trace / RBC: 2 /HPF / WBC 15 /HPF   Sq Epi: x / Non Sq Epi: x / Bacteria: x        PERIPHERAL BLOOD SMEAR REVIEW:      RADIOLOGY & ADDITIONAL STUDIES:  < from: CT Abdomen and Pelvis w/ IV Cont (23 @ 08:29) >    ACC: [id number] EXAM:  CT ABDOMEN AND PELVIS IC   ORDERED BY: THU MAGDALENO     PROCEDURE DATE:  2023          INTERPRETATION:  CLINICAL INFORMATION: Brought in for fall, found to be   hypotensive. History of perirectal abscess. Evaluate for perirectal   abscess versus fistula.    COMPARISON: None.    CONTRAST/COMPLICATIONS:  IV Contrast: Omnipaque 350  90 cc administered   10 cc discarded  Oral Contrast: NONE  Complications: None reported at time of study completion    PROCEDURE:  CT of the Abdomen and Pelvis was performed.  Sagittal and coronal reformats were performed.    FINDINGS:  LOWER CHEST: Small pericardial effusion. Normal heart size. Aortic   valvular and coronary artery calcifications. Visualized lung bases are   clear.    LIVER: Within normal limits.  BILE DUCTS: Normal caliber.  GALLBLADDER: Within normal limits.  SPLEEN: Within normal limits.  PANCREAS: Within normal limits.  ADRENALS: Within normal limits.  KIDNEYS/URETERS: No renal stones or hydronephrosis. Complex left renal   cyst in lower pole, multiple thin internal septations, measures up to 4.5   x 4.5 x 5 cm (AP by transverse by cephalocaudal planes). Bilateral   symmetric perinephric fat stranding, nonspecific.    BLADDER: Within normal limits.  REPRODUCTIVE ORGANS: Prostate within normal limits. Bilateral seminal   vesicles calcifications which can be seen with diabetes.    BOWEL: No bowel obstruction. Scattered colonic diverticula.. Appendix is   normal.  PERITONEUM: No ascites. No intraabdominal abscess.  VESSELS: Atherosclerotic changes.  RETROPERITONEUM/LYMPH NODES: No lymphadenopathy.  ABDOMINAL WALL: Within normal limits.  BONES: No acute displaced fracture or bony malalignment..Multilevel   moderate to severe discogenic degenerative changes. Mild retrolisthesis   L5 on S1. Diffuse idiopathic skeletal hyperostosis.    IMPRESSION:  Perianal/ischioanal soft tissue are only partially imaged, no abscess   seen.  Incidental multiseptate left renal cystic lesion, 5 cm, indeterminate,   possibly cystic neoplasm or complicated cyst. Recommend outpatient   follow-up with urology and dedicated contrast-enhanced renal mass   protocol CT or MRI for further characterization. Comparison with any   relevant imaging from an outside facility could be helpful.      --- End of Report ---            DENNISE HARDY MD; Attending Radiologist  This document has been electronically signed. Aug 20 2023  9:16AM    < end of copied text >   Muscle Hinge Flap Text: The defect edges were debeveled with a #15 scalpel blade.  Given the size, depth and location of the defect and the proximity to free margins a muscle hinge flap was deemed most appropriate. Using a sterile surgical marker, an appropriate hinge flap was drawn incorporating the defect. The area thus outlined was incised with a #15 scalpel blade. The skin margins were undermined to an appropriate distance in all directions utilizing iris scissors. Following this, the designed flap was placed in the primary defect and sutured into place.

## 2024-07-23 ENCOUNTER — APPOINTMENT (OUTPATIENT)
Dept: CARDIOLOGY | Facility: CLINIC | Age: 69
End: 2024-07-23

## 2024-07-31 ENCOUNTER — MED ADMIN CHARGE (OUTPATIENT)
Age: 69
End: 2024-07-31

## 2024-07-31 ENCOUNTER — APPOINTMENT (OUTPATIENT)
Dept: CARDIOLOGY | Facility: CLINIC | Age: 69
End: 2024-07-31
Payer: MEDICARE

## 2024-07-31 ENCOUNTER — NON-APPOINTMENT (OUTPATIENT)
Age: 69
End: 2024-07-31

## 2024-07-31 VITALS
BODY MASS INDEX: 31.08 KG/M2 | OXYGEN SATURATION: 94 % | HEART RATE: 82 BPM | HEIGHT: 71 IN | DIASTOLIC BLOOD PRESSURE: 82 MMHG | WEIGHT: 222 LBS | SYSTOLIC BLOOD PRESSURE: 148 MMHG

## 2024-07-31 VITALS — SYSTOLIC BLOOD PRESSURE: 138 MMHG | DIASTOLIC BLOOD PRESSURE: 82 MMHG

## 2024-07-31 DIAGNOSIS — R06.00 DYSPNEA, UNSPECIFIED: ICD-10-CM

## 2024-07-31 DIAGNOSIS — I10 ESSENTIAL (PRIMARY) HYPERTENSION: ICD-10-CM

## 2024-07-31 DIAGNOSIS — E78.5 HYPERLIPIDEMIA, UNSPECIFIED: ICD-10-CM

## 2024-07-31 PROCEDURE — 93306 TTE W/DOPPLER COMPLETE: CPT

## 2024-07-31 PROCEDURE — 93000 ELECTROCARDIOGRAM COMPLETE: CPT

## 2024-07-31 PROCEDURE — 99204 OFFICE O/P NEW MOD 45 MIN: CPT

## 2024-07-31 RX ORDER — PERFLUTREN 6.52 MG/ML
6.52 INJECTION, SUSPENSION INTRAVENOUS
Qty: 2 | Refills: 0 | Status: COMPLETED | OUTPATIENT
Start: 2024-07-31

## 2024-07-31 RX ORDER — SIMVASTATIN 80 MG/1
TABLET, FILM COATED ORAL
Refills: 0 | Status: ACTIVE | COMMUNITY

## 2024-07-31 RX ORDER — CARVEDILOL 25 MG/1
TABLET, FILM COATED ORAL
Refills: 0 | Status: ACTIVE | COMMUNITY

## 2024-07-31 RX ADMIN — PERFLUTREN MG/ML: 6.52 INJECTION, SUSPENSION INTRAVENOUS at 00:00

## 2024-07-31 NOTE — DISCUSSION/SUMMARY
[FreeTextEntry1] : Adryan is a 69-year-old male with hypertension, hyperlipidemia, and asbestos exposure.  Overall, he seems to be feeling well, without any increase symptoms.  He had a recent CT as part of a lung cancer screening which demonstrated a moderate to large pericardial effusion, a mass in the left kidney, and a pulmonary nodule.  His blood pressure is on the elevated side though improved with repeat evaluation.  He is not tachycardic nor have worsening symptoms.  He will continue his carvedilol, along with a statin given his atherosclerosis.  He will have an echocardiogram to reevaluate the effusion.  He did have a small effusion noted on an echocardiogram in 2021, and on a CT during a hospitalization for a perianal abscess in 2023.  He needs to stop smoking, though is not interested.  We will speak after the echocardiogram and arrange close follow-up. [EKG obtained to assist in diagnosis and management of assessed problem(s)] : EKG obtained to assist in diagnosis and management of assessed problem(s)

## 2024-08-01 NOTE — H&P ADULT - NSVTERISKASSESS_GEN_ALL_CORE FT
DISPLAY PLAN FREE TEXT DISPLAY PLAN FREE TEXT DISPLAY PLAN FREE TEXT DISPLAY PLAN FREE TEXT Medical Assessment Completed on: 20-Aug-2023 13:56

## 2024-08-05 ENCOUNTER — NON-APPOINTMENT (OUTPATIENT)
Age: 69
End: 2024-08-05

## 2024-08-06 ENCOUNTER — APPOINTMENT (OUTPATIENT)
Dept: UROLOGY | Facility: CLINIC | Age: 69
End: 2024-08-06

## 2024-08-06 PROBLEM — N28.89 RENAL MASS: Status: ACTIVE | Noted: 2024-08-06

## 2024-08-06 PROCEDURE — G2211 COMPLEX E/M VISIT ADD ON: CPT

## 2024-08-06 PROCEDURE — 99204 OFFICE O/P NEW MOD 45 MIN: CPT

## 2024-08-06 NOTE — HISTORY OF PRESENT ILLNESS
[FreeTextEntry1] : 68 yo male smoker, former  with asbestos exposure underwent CT for lung screening noted a 5 cm renal mass Had CT PET performed ([facility]) - lung lesion not active, CT showed 5x5 cm R LP lesion  Feels well, denies pain, hematuria Long time smoker - interested in quitting

## 2024-08-06 NOTE — ASSESSMENT
[FreeTextEntry1] : MRI abdomen now -- f/u after scan to discuss cystic versus solid mass  smoking cessation information given

## 2024-08-14 ENCOUNTER — APPOINTMENT (OUTPATIENT)
Dept: MRI IMAGING | Facility: CLINIC | Age: 69
End: 2024-08-14

## 2024-08-14 PROCEDURE — A9585: CPT | Mod: JZ

## 2024-08-14 PROCEDURE — 74183 MRI ABD W/O CNTR FLWD CNTR: CPT

## 2024-09-03 ENCOUNTER — APPOINTMENT (OUTPATIENT)
Dept: UROLOGY | Facility: CLINIC | Age: 69
End: 2024-09-03
Payer: MEDICARE

## 2024-09-03 VITALS
DIASTOLIC BLOOD PRESSURE: 84 MMHG | SYSTOLIC BLOOD PRESSURE: 144 MMHG | BODY MASS INDEX: 31.78 KG/M2 | OXYGEN SATURATION: 96 % | HEART RATE: 66 BPM | HEIGHT: 71 IN | WEIGHT: 227 LBS

## 2024-09-03 DIAGNOSIS — N28.89 OTHER SPECIFIED DISORDERS OF KIDNEY AND URETER: ICD-10-CM

## 2024-09-03 PROCEDURE — G2211 COMPLEX E/M VISIT ADD ON: CPT

## 2024-09-03 PROCEDURE — 99213 OFFICE O/P EST LOW 20 MIN: CPT

## 2024-09-03 NOTE — ASSESSMENT
[FreeTextEntry1] : MR abdomen in 6 months for renal cyst Message sent to Dr. Marion regarding pericardial effusion

## 2024-09-03 NOTE — HISTORY OF PRESENT ILLNESS
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 Increased to 6 cm from 5 cm since Aug 2023. Seeing cardiologist for fluid around heart

## 2024-10-16 ENCOUNTER — APPOINTMENT (OUTPATIENT)
Dept: CARDIOLOGY | Facility: CLINIC | Age: 69
End: 2024-10-16
Payer: MEDICARE

## 2024-10-16 VITALS
SYSTOLIC BLOOD PRESSURE: 156 MMHG | HEIGHT: 71 IN | DIASTOLIC BLOOD PRESSURE: 74 MMHG | WEIGHT: 237 LBS | BODY MASS INDEX: 33.18 KG/M2 | HEART RATE: 74 BPM | OXYGEN SATURATION: 95 %

## 2024-10-16 DIAGNOSIS — R06.00 DYSPNEA, UNSPECIFIED: ICD-10-CM

## 2024-10-16 DIAGNOSIS — E78.5 HYPERLIPIDEMIA, UNSPECIFIED: ICD-10-CM

## 2024-10-16 DIAGNOSIS — I10 ESSENTIAL (PRIMARY) HYPERTENSION: ICD-10-CM

## 2024-10-16 PROCEDURE — 93000 ELECTROCARDIOGRAM COMPLETE: CPT

## 2024-10-16 PROCEDURE — 99214 OFFICE O/P EST MOD 30 MIN: CPT

## 2024-10-17 ENCOUNTER — NON-APPOINTMENT (OUTPATIENT)
Age: 69
End: 2024-10-17

## 2024-10-24 ENCOUNTER — APPOINTMENT (OUTPATIENT)
Dept: CARDIOLOGY | Facility: CLINIC | Age: 69
End: 2024-10-24
Payer: MEDICARE

## 2024-10-24 ENCOUNTER — MED ADMIN CHARGE (OUTPATIENT)
Age: 69
End: 2024-10-24

## 2024-10-24 PROCEDURE — 93306 TTE W/DOPPLER COMPLETE: CPT

## 2024-10-24 RX ADMIN — PERFLUTREN MG/ML: 6.52 INJECTION, SUSPENSION INTRAVENOUS at 00:00

## 2024-10-27 RX ORDER — PERFLUTREN 6.52 MG/ML
6.52 INJECTION, SUSPENSION INTRAVENOUS
Qty: 1 | Refills: 0 | Status: COMPLETED | OUTPATIENT
Start: 2024-10-24

## 2025-01-02 ENCOUNTER — APPOINTMENT (OUTPATIENT)
Dept: MRI IMAGING | Facility: CLINIC | Age: 70
End: 2025-01-02
Payer: MEDICARE

## 2025-01-02 PROCEDURE — 74183 MRI ABD W/O CNTR FLWD CNTR: CPT

## 2025-01-02 PROCEDURE — A9585: CPT | Mod: JZ

## 2025-01-27 DIAGNOSIS — I31.39 OTHER PERICARDIAL EFFUSION (NONINFLAMMATORY): ICD-10-CM

## 2025-01-28 ENCOUNTER — APPOINTMENT (OUTPATIENT)
Dept: CARDIOLOGY | Facility: CLINIC | Age: 70
End: 2025-01-28
Payer: MEDICARE

## 2025-01-28 PROCEDURE — 93321 DOPPLER ECHO F-UP/LMTD STD: CPT

## 2025-01-28 PROCEDURE — 93308 TTE F-UP OR LMTD: CPT

## 2025-02-04 ENCOUNTER — APPOINTMENT (OUTPATIENT)
Dept: MRI IMAGING | Facility: CLINIC | Age: 70
End: 2025-02-04

## 2025-02-12 NOTE — HISTORY OF PRESENT ILLNESS
Last Office Visit   2024  Upcomin/10/2025    Patient comment: Been having a lot of anxiety and I would like the prescription to be filled as soon as possible, please     Last Refill    Outpatient Medication Detail     Disp Refills Start End    ALPRAZolam (XANAX) 0.5 MG tablet 20 tablet 0 1/3/2025 --    Sig - Route: Take 1 tablet by mouth 3 times daily as needed for Anxiety. - Oral    Sent to pharmacy as: ALPRAZolam 0.5 MG Oral Tablet (XANAX)    Class: Eprescribe    E-Prescribing Status: Receipt confirmed by pharmacy (1/3/2025  3:58 PM CST)      No Protocol  Medication has been pended for provider review.   
[FreeTextEntry1] : Adryan is a 69-year-old male here for follow up.  He reports a history of hypertension and hyperlipidemia.  I last saw him in 2021. Echo with normal LV function, minimal MR, moderate DD and mildly elevated pulm pressures, small pericardial effusion. Pharm stress test without ischemia.  He saw a cardiologist >10 years ago, and reports eventually being sent to [place] for cardiac catheterization which was unremarkable. He also reports a past medical history of asbestos exposure.  He has mild dyspnea, which he attributes this.  He has no chest pain or palpitations.  He also denies lower extremity swelling, orthopnea, PND, dizziness, lightheadedness, and near syncope.  His exercise tolerance has been limited by orthopedic issues.  He currently smokes about 1/2 pack/day.  He denies a family history of premature CAD. He does take some medications, though the list is unavailable (coreg and zocor)  He had a recent CT which showed water around the heart. He was recommended to see a cardiologist.  A kidney mass was also noted.

## 2025-02-18 ENCOUNTER — NON-APPOINTMENT (OUTPATIENT)
Age: 70
End: 2025-02-18

## 2025-02-18 ENCOUNTER — APPOINTMENT (OUTPATIENT)
Dept: CARDIOLOGY | Facility: CLINIC | Age: 70
End: 2025-02-18
Payer: MEDICARE

## 2025-02-18 VITALS
BODY MASS INDEX: 35.14 KG/M2 | HEART RATE: 79 BPM | WEIGHT: 251 LBS | HEIGHT: 71 IN | SYSTOLIC BLOOD PRESSURE: 175 MMHG | OXYGEN SATURATION: 93 % | DIASTOLIC BLOOD PRESSURE: 89 MMHG

## 2025-02-18 VITALS — DIASTOLIC BLOOD PRESSURE: 85 MMHG | SYSTOLIC BLOOD PRESSURE: 138 MMHG

## 2025-02-18 DIAGNOSIS — I31.39 OTHER PERICARDIAL EFFUSION (NONINFLAMMATORY): ICD-10-CM

## 2025-02-18 DIAGNOSIS — R06.00 DYSPNEA, UNSPECIFIED: ICD-10-CM

## 2025-02-18 PROCEDURE — 99214 OFFICE O/P EST MOD 30 MIN: CPT

## 2025-02-18 PROCEDURE — 93000 ELECTROCARDIOGRAM COMPLETE: CPT

## 2025-03-04 ENCOUNTER — APPOINTMENT (OUTPATIENT)
Dept: UROLOGY | Facility: CLINIC | Age: 70
End: 2025-03-04

## 2025-05-15 NOTE — PATIENT PROFILE ADULT - CAREGIVER PHONE NUMBER
Patient request a refill for     fluticasone-umeclidin-vilanterol (TRELEGY ELLIPTA) 100-62.5-25 MCG/ACT inhaler   - [facility] =   [phone]

## 2025-08-05 ENCOUNTER — APPOINTMENT (OUTPATIENT)
Dept: CARDIOLOGY | Facility: CLINIC | Age: 70
End: 2025-08-05
Payer: MEDICARE

## 2025-08-05 PROCEDURE — 93306 TTE W/DOPPLER COMPLETE: CPT

## 2025-08-05 RX ORDER — PERFLUTREN 6.52 MG/ML
6.52 INJECTION, SUSPENSION INTRAVENOUS
Qty: 2 | Refills: 0 | Status: COMPLETED | OUTPATIENT
Start: 2025-08-05

## 2025-08-05 RX ADMIN — PERFLUTREN MG/ML: 6.52 INJECTION, SUSPENSION INTRAVENOUS at 00:00

## 2025-08-14 ENCOUNTER — APPOINTMENT (OUTPATIENT)
Dept: CARDIOLOGY | Facility: CLINIC | Age: 70
End: 2025-08-14
Payer: MEDICARE

## 2025-08-14 VITALS
DIASTOLIC BLOOD PRESSURE: 84 MMHG | HEIGHT: 71 IN | OXYGEN SATURATION: 95 % | WEIGHT: 259 LBS | SYSTOLIC BLOOD PRESSURE: 166 MMHG | BODY MASS INDEX: 36.26 KG/M2 | HEART RATE: 66 BPM

## 2025-08-14 VITALS — DIASTOLIC BLOOD PRESSURE: 82 MMHG | SYSTOLIC BLOOD PRESSURE: 148 MMHG

## 2025-08-14 DIAGNOSIS — I31.39 OTHER PERICARDIAL EFFUSION (NONINFLAMMATORY): ICD-10-CM

## 2025-08-14 DIAGNOSIS — R06.00 DYSPNEA, UNSPECIFIED: ICD-10-CM

## 2025-08-14 PROCEDURE — 93000 ELECTROCARDIOGRAM COMPLETE: CPT

## 2025-08-14 PROCEDURE — 99214 OFFICE O/P EST MOD 30 MIN: CPT
